# Patient Record
Sex: FEMALE | Race: WHITE | Employment: PART TIME | URBAN - METROPOLITAN AREA
[De-identification: names, ages, dates, MRNs, and addresses within clinical notes are randomized per-mention and may not be internally consistent; named-entity substitution may affect disease eponyms.]

---

## 2020-04-03 ENCOUNTER — HOSPITAL ENCOUNTER (EMERGENCY)
Age: 60
Discharge: HOME | End: 2020-04-03
Attending: EMERGENCY MEDICINE

## 2020-04-03 VITALS
HEART RATE: 92 BPM | HEIGHT: 63 IN | WEIGHT: 116 LBS | SYSTOLIC BLOOD PRESSURE: 128 MMHG | RESPIRATION RATE: 16 BRPM | OXYGEN SATURATION: 97 % | TEMPERATURE: 98.9 F | BODY MASS INDEX: 20.55 KG/M2 | DIASTOLIC BLOOD PRESSURE: 74 MMHG

## 2020-04-03 PROCEDURE — 99283 EMERGENCY DEPT VISIT LOW MDM: CPT

## 2020-04-03 RX ORDER — DOXYCYCLINE HYCLATE 100 MG/1
100 CAPSULE ORAL 2 TIMES DAILY
COMMUNITY
End: 2020-04-03

## 2020-04-03 ASSESSMENT — PAIN DESCRIPTION - LOCATION: LOCATION: THROAT

## 2020-04-03 ASSESSMENT — PAIN SCALES - GENERAL: PAINLEVEL_OUTOF10: 5

## 2020-04-03 NOTE — ED PROVIDER NOTES
HPI:   Loreta De León is a 61 y.o. female presenting to the ED for allergic rxn, beginning 4 hours ago. The complaint has been persistent, moderate in severity, and worsened by nothing. Pt has been experiencing mild congestion and discomfort for 2 weeks following sinus infection. Pt has been prescribed Z-pack by her PCP, which she did not complete since it made her feel tingly in the face and gave her a panic attack. Then today she was switched to doxy, took 1 and an hour later she felt some tingling in her face, 2 hours later there was more tingling. She called her pcp who advised benadryl. States benadryl takes away the tingling on her face. Having considerable polydipsia which \"cannot be satisfied\". She called her pcp and was advided to jordan 50mg benadryl every 4 hours till tomorrow. She has had multiple allergic medication reactions which are ill defined and do not have the classic allergy sx. Pt smokes half a pack per day and states bronchitis yearly around springtime. Patient denies SOB, fever/chills or other complaints. ROS:   Pertinent positives and negatives are stated within HPI, all other systems reviewed and are negative.    --------------------------------------------- PAST HISTORY ---------------------------------------------  Past Medical History:  has no past medical history on file. Past Surgical History:  has a past surgical history that includes Meckel's diverticulum excision. Social History:  reports that she has been smoking. She has been smoking about 0.50 packs per day. She has never used smokeless tobacco. She reports that she does not drink alcohol or use drugs. Family History: family history is not on file. The patients home medications have been reviewed.     Allergies: Penicillins    -------------------------------------------------- RESULTS -------------------------------------------------  All laboratory and radiology results have been personally reviewed

## 2020-09-18 ENCOUNTER — APPOINTMENT (OUTPATIENT)
Dept: CT IMAGING | Age: 60
End: 2020-09-18

## 2020-09-18 ENCOUNTER — APPOINTMENT (OUTPATIENT)
Dept: GENERAL RADIOLOGY | Age: 60
End: 2020-09-18

## 2020-09-18 ENCOUNTER — HOSPITAL ENCOUNTER (OUTPATIENT)
Age: 60
Setting detail: OBSERVATION
Discharge: HOME | End: 2020-09-19
Attending: EMERGENCY MEDICINE | Admitting: FAMILY MEDICINE

## 2020-09-18 VITALS
HEART RATE: 66 BPM | WEIGHT: 120 LBS | OXYGEN SATURATION: 98 % | BODY MASS INDEX: 21.26 KG/M2 | HEIGHT: 63 IN | TEMPERATURE: 97.4 F | RESPIRATION RATE: 16 BRPM | DIASTOLIC BLOOD PRESSURE: 72 MMHG | SYSTOLIC BLOOD PRESSURE: 129 MMHG

## 2020-09-18 PROBLEM — R07.9 CHEST PAIN: Status: ACTIVE | Noted: 2020-09-18

## 2020-09-18 LAB
ALBUMIN SERPL-MCNC: 4.5 G/DL (ref 3.5–5.2)
ALP BLD-CCNC: 72 U/L (ref 35–104)
ALT SERPL-CCNC: 18 U/L (ref 0–32)
ANION GAP SERPL CALCULATED.3IONS-SCNC: 10 MMOL/L (ref 7–16)
AST SERPL-CCNC: 27 U/L (ref 0–31)
BASOPHILS ABSOLUTE: 0.1 E9/L (ref 0–0.2)
BASOPHILS RELATIVE PERCENT: 0.8 % (ref 0–2)
BILIRUB SERPL-MCNC: 0.3 MG/DL (ref 0–1.2)
BUN BLDV-MCNC: 12 MG/DL (ref 8–23)
CALCIUM SERPL-MCNC: 10.1 MG/DL (ref 8.6–10.2)
CHLORIDE BLD-SCNC: 104 MMOL/L (ref 98–107)
CO2: 25 MMOL/L (ref 22–29)
CREAT SERPL-MCNC: 0.8 MG/DL (ref 0.5–1)
EKG ATRIAL RATE: 89 BPM
EKG ATRIAL RATE: 90 BPM
EKG P AXIS: 62 DEGREES
EKG P AXIS: 70 DEGREES
EKG P-R INTERVAL: 140 MS
EKG P-R INTERVAL: 148 MS
EKG Q-T INTERVAL: 358 MS
EKG Q-T INTERVAL: 364 MS
EKG QRS DURATION: 72 MS
EKG QRS DURATION: 74 MS
EKG QTC CALCULATION (BAZETT): 437 MS
EKG QTC CALCULATION (BAZETT): 442 MS
EKG R AXIS: 32 DEGREES
EKG R AXIS: 51 DEGREES
EKG T AXIS: 28 DEGREES
EKG T AXIS: 8 DEGREES
EKG VENTRICULAR RATE: 89 BPM
EKG VENTRICULAR RATE: 90 BPM
EOSINOPHILS ABSOLUTE: 0.07 E9/L (ref 0.05–0.5)
EOSINOPHILS RELATIVE PERCENT: 0.6 % (ref 0–6)
GFR AFRICAN AMERICAN: >60
GFR NON-AFRICAN AMERICAN: >60 ML/MIN/1.73
GLUCOSE BLD-MCNC: 110 MG/DL (ref 74–99)
HCT VFR BLD CALC: 42.3 % (ref 34–48)
HEMOGLOBIN: 14.5 G/DL (ref 11.5–15.5)
IMMATURE GRANULOCYTES #: 0.06 E9/L
IMMATURE GRANULOCYTES %: 0.5 % (ref 0–5)
LACTIC ACID, SEPSIS: 1.1 MMOL/L (ref 0.5–1.9)
LIPASE: 25 U/L (ref 13–60)
LYMPHOCYTES ABSOLUTE: 1.94 E9/L (ref 1.5–4)
LYMPHOCYTES RELATIVE PERCENT: 16 % (ref 20–42)
MCH RBC QN AUTO: 32.1 PG (ref 26–35)
MCHC RBC AUTO-ENTMCNC: 34.3 % (ref 32–34.5)
MCV RBC AUTO: 93.6 FL (ref 80–99.9)
MONOCYTES ABSOLUTE: 0.57 E9/L (ref 0.1–0.95)
MONOCYTES RELATIVE PERCENT: 4.7 % (ref 2–12)
NEUTROPHILS ABSOLUTE: 9.35 E9/L (ref 1.8–7.3)
NEUTROPHILS RELATIVE PERCENT: 77.4 % (ref 43–80)
PDW BLD-RTO: 13.5 FL (ref 11.5–15)
PLATELET # BLD: 275 E9/L (ref 130–450)
PMV BLD AUTO: 9.8 FL (ref 7–12)
POTASSIUM REFLEX MAGNESIUM: 4.3 MMOL/L (ref 3.5–5)
RBC # BLD: 4.52 E12/L (ref 3.5–5.5)
SODIUM BLD-SCNC: 139 MMOL/L (ref 132–146)
TOTAL PROTEIN: 7.9 G/DL (ref 6.4–8.3)
TROPONIN: <0.01 NG/ML (ref 0–0.03)
TROPONIN: <0.01 NG/ML (ref 0–0.03)
WBC # BLD: 12.1 E9/L (ref 4.5–11.5)

## 2020-09-18 PROCEDURE — 84484 ASSAY OF TROPONIN QUANT: CPT

## 2020-09-18 PROCEDURE — G0378 HOSPITAL OBSERVATION PER HR: HCPCS

## 2020-09-18 PROCEDURE — 83605 ASSAY OF LACTIC ACID: CPT

## 2020-09-18 PROCEDURE — 36415 COLL VENOUS BLD VENIPUNCTURE: CPT

## 2020-09-18 PROCEDURE — 74176 CT ABD & PELVIS W/O CONTRAST: CPT

## 2020-09-18 PROCEDURE — 80053 COMPREHEN METABOLIC PANEL: CPT

## 2020-09-18 PROCEDURE — 83690 ASSAY OF LIPASE: CPT

## 2020-09-18 PROCEDURE — 6370000000 HC RX 637 (ALT 250 FOR IP): Performed by: EMERGENCY MEDICINE

## 2020-09-18 PROCEDURE — 99285 EMERGENCY DEPT VISIT HI MDM: CPT

## 2020-09-18 PROCEDURE — 93005 ELECTROCARDIOGRAM TRACING: CPT | Performed by: EMERGENCY MEDICINE

## 2020-09-18 PROCEDURE — 71046 X-RAY EXAM CHEST 2 VIEWS: CPT

## 2020-09-18 PROCEDURE — 85025 COMPLETE CBC W/AUTO DIFF WBC: CPT

## 2020-09-18 PROCEDURE — 93010 ELECTROCARDIOGRAM REPORT: CPT | Performed by: INTERNAL MEDICINE

## 2020-09-18 RX ORDER — MULTIVIT WITH MINERALS/LUTEIN
1000 TABLET ORAL DAILY
COMMUNITY

## 2020-09-18 RX ORDER — NITROGLYCERIN 0.4 MG/1
0.4 TABLET SUBLINGUAL ONCE
Status: COMPLETED | OUTPATIENT
Start: 2020-09-18 | End: 2020-09-18

## 2020-09-18 RX ORDER — ASPIRIN 325 MG
325 TABLET ORAL ONCE
Status: COMPLETED | OUTPATIENT
Start: 2020-09-18 | End: 2020-09-18

## 2020-09-18 RX ADMIN — NITROGLYCERIN 0.4 MG: 0.4 TABLET, ORALLY DISINTEGRATING SUBLINGUAL at 14:01

## 2020-09-18 RX ADMIN — LIDOCAINE HYDROCHLORIDE: 20 SOLUTION ORAL; TOPICAL at 11:39

## 2020-09-18 RX ADMIN — ASPIRIN 325 MG: 325 TABLET, FILM COATED ORAL at 11:37

## 2020-09-18 ASSESSMENT — PAIN DESCRIPTION - PAIN TYPE
TYPE: ACUTE PAIN

## 2020-09-18 ASSESSMENT — PAIN SCALES - GENERAL
PAINLEVEL_OUTOF10: 4
PAINLEVEL_OUTOF10: 4
PAINLEVEL_OUTOF10: 2
PAINLEVEL_OUTOF10: 1
PAINLEVEL_OUTOF10: 1

## 2020-09-18 ASSESSMENT — PAIN DESCRIPTION - DESCRIPTORS
DESCRIPTORS: ACHING
DESCRIPTORS: TIGHTNESS

## 2020-09-18 ASSESSMENT — PAIN DESCRIPTION - LOCATION
LOCATION: ABDOMEN
LOCATION: ABDOMEN
LOCATION: CHEST;ARM

## 2020-09-18 ASSESSMENT — PAIN DESCRIPTION - ONSET: ONSET: SUDDEN

## 2020-09-18 ASSESSMENT — PAIN DESCRIPTION - ORIENTATION
ORIENTATION: LEFT;UPPER
ORIENTATION: LEFT
ORIENTATION: LEFT;UPPER

## 2020-09-18 ASSESSMENT — PAIN DESCRIPTION - PROGRESSION: CLINICAL_PROGRESSION: GRADUALLY WORSENING

## 2020-09-18 ASSESSMENT — PAIN DESCRIPTION - FREQUENCY: FREQUENCY: CONTINUOUS

## 2020-09-18 NOTE — ED NOTES
After getting IV in, pt stated that it was painful and wanted it taken out. Pt very nervous and shaking badly, refusing to have any further IV's in her arm. Able to talk pt into letting us at least stick her for the blood work. Blood sent to lab.       Brigid Urrutia RN  09/18/20 [id number]

## 2020-09-18 NOTE — ED PROVIDER NOTES
Department of Emergency Medicine   ED  Provider Note  Admit Date/RoomTime: 9/18/2020 11:11 AM  ED Room: 8210/8210-B          History of Present Illness:  9/18/20, Time: 12:18 PM EDT  Chief Complaint   Patient presents with    Chest Pain     gas and bloating all week long. Tightness in left chest and pain in left arm started last weekend. Geraldine Bates is a 61 y.o. female presenting to the ED for chest tightness, beginning several days ago. The complaint has been intermittent, moderate in severity, and worsened by nothing. Intermittent chest tightness and feeling like she has ingestion. Reports she has been having pain in her abdomen, feeling bloated and radiating up and down her chest. She also reports having separate chest tightness/pressure radiating to her arms. Intermittent symptoms. No shortness of breath. No sharp or stabbing pain. No ripping or taring pain. No back pain. No hx of DVT or PE    Review of Systems:   Pertinent positives and negatives are stated within HPI, all other systems reviewed and are negative.        --------------------------------------------- PAST HISTORY ---------------------------------------------  Past Medical History:  has no past medical history on file. Past Surgical History:  has a past surgical history that includes Meckel's diverticulum excision. Social History:  reports that she has been smoking. She has been smoking about 0.50 packs per day. She has never used smokeless tobacco. She reports that she does not drink alcohol or use drugs. Family History: family history is not on file. . Unless otherwise noted, family history is non contributory    The patients home medications have been reviewed.     Allergies: Amoxicillin; Penicillins; Sulfa antibiotics; and Zithromax [azithromycin]    I have reviewed the past medical history, past surgical history, social history, and family history    ---------------------------------------------------PHYSICAL EXAM--------------------------------------    Constitutional/General: Alert and oriented x3  Head: Normocephalic and atraumatic  Eyes: PERRL, EOMI, sclera non icteric  Mouth: Oropharynx clear, handling secretions  Neck: Supple, full ROM, no stridor, no meningeal signs  Respiratory: Lungs clear to auscultation bilaterally, no wheezes, rales, or rhonchi. Not in respiratory distress  Cardiovascular:  Regular rate. Regular rhythm. No murmurs, no aortic murmurs, no gallops, no rubs. 2+ distal pulses. Equal extremity pulses. Gastrointestinal:  Abdomen Soft, mild generalized tenderness. Non distended. No rebound, guarding, or rigidity. No pulsatile masses. Musculoskeletal: Moves all extremities x 4. Warm and well perfused, no clubbing, no cyanosis, no edema. Capillary refill <3 seconds  Skin: skin warm and dry. No rashes. Neurologic: GCS 15, no focal deficits        -------------------------------------------------- RESULTS -------------------------------------------------  I have personally reviewed all laboratory and imaging results for this patient. Results are listed below.      LABS: (Lab results interpreted by me)  Results for orders placed or performed during the hospital encounter of 09/18/20   CBC Auto Differential   Result Value Ref Range    WBC 12.1 (H) 4.5 - 11.5 E9/L    RBC 4.52 3.50 - 5.50 E12/L    Hemoglobin 14.5 11.5 - 15.5 g/dL    Hematocrit 42.3 34.0 - 48.0 %    MCV 93.6 80.0 - 99.9 fL    MCH 32.1 26.0 - 35.0 pg    MCHC 34.3 32.0 - 34.5 %    RDW 13.5 11.5 - 15.0 fL    Platelets 453 490 - 188 E9/L    MPV 9.8 7.0 - 12.0 fL    Neutrophils % 77.4 43.0 - 80.0 %    Immature Granulocytes % 0.5 0.0 - 5.0 %    Lymphocytes % 16.0 (L) 20.0 - 42.0 %    Monocytes % 4.7 2.0 - 12.0 %    Eosinophils % 0.6 0.0 - 6.0 %    Basophils % 0.8 0.0 - 2.0 %    Neutrophils Absolute 9.35 (H) 1.80 - 7.30 E9/L    Immature Granulocytes # 0.06 E9/L    Lymphocytes Absolute 1.94 1.50 - 4.00 E9/L    Monocytes Absolute 0.57 0.10 - 0.95 E9/L    Eosinophils Absolute 0.07 0.05 - 0.50 E9/L    Basophils Absolute 0.10 0.00 - 0.20 E9/L   Comprehensive Metabolic Panel w/ Reflex to MG   Result Value Ref Range    Sodium 139 132 - 146 mmol/L    Potassium reflex Magnesium 4.3 3.5 - 5.0 mmol/L    Chloride 104 98 - 107 mmol/L    CO2 25 22 - 29 mmol/L    Anion Gap 10 7 - 16 mmol/L    Glucose 110 (H) 74 - 99 mg/dL    BUN 12 8 - 23 mg/dL    CREATININE 0.8 0.5 - 1.0 mg/dL    GFR Non-African American >60 >=60 mL/min/1.73    GFR African American >60     Calcium 10.1 8.6 - 10.2 mg/dL    Total Protein 7.9 6.4 - 8.3 g/dL    Alb 4.5 3.5 - 5.2 g/dL    Total Bilirubin 0.3 0.0 - 1.2 mg/dL    Alkaline Phosphatase 72 35 - 104 U/L    ALT 18 0 - 32 U/L    AST 27 0 - 31 U/L   Troponin   Result Value Ref Range    Troponin <0.01 0.00 - 0.03 ng/mL   Lactate, Sepsis   Result Value Ref Range    Lactic Acid, Sepsis 1.1 0.5 - 1.9 mmol/L   Lipase   Result Value Ref Range    Lipase 25 13 - 60 U/L   Troponin   Result Value Ref Range    Troponin <0.01 0.00 - 0.03 ng/mL   EKG 12 Lead   Result Value Ref Range    Ventricular Rate 90 BPM    Atrial Rate 90 BPM    P-R Interval 148 ms    QRS Duration 72 ms    Q-T Interval 358 ms    QTc Calculation (Bazett) 437 ms    P Axis 62 degrees    R Axis 32 degrees    T Axis 8 degrees   EKG 12 Lead   Result Value Ref Range    Ventricular Rate 89 BPM    Atrial Rate 89 BPM    P-R Interval 140 ms    QRS Duration 74 ms    Q-T Interval 364 ms    QTc Calculation (Bazett) 442 ms    P Axis 70 degrees    R Axis 51 degrees    T Axis 28 degrees   ,       RADIOLOGY:  Interpreted by Radiologist unless otherwise specified  CT ABDOMEN PELVIS WO CONTRAST Additional Contrast? None   Final Result      1. Tiny hepatic probable cysts. 2. A 1.2 cm left ovarian cyst.      3. No diverticulitis or bowel obstruction.                XR CHEST (2 VW)   Final Result   Mild emphysematous pulmonary hyperinflation/air trapping without   evidence of acute cardiopulmonary pathology. EKG Interpretation  Interpreted by emergency department physician, Dr. Aakash Paez     Date of EK20  Time: 113    Rhythm: normal sinus   Rate: normal  Axis: normal  Conduction: normal  ST Segments: depression in  v3, v4, v5, v6, II, III and aVf  T Waves: no acute change    Clinical Impression: sinus rhythm with inferior lateral ST depression, no ST elevation  Comparison to prior EKG: no previous EKG      ------------------------- NURSING NOTES AND VITALS REVIEWED ---------------------------   The nursing notes within the ED encounter and vital signs as below have been reviewed by myself  /72   Pulse 66   Temp 97.4 °F (36.3 °C) (Temporal)   Resp 16   Ht 5' 3\" (1.6 m)   Wt 120 lb (54.4 kg)   SpO2 98%   BMI 21.26 kg/m²     Oxygen Saturation Interpretation: Normal    The patients available past medical records and past encounters were reviewed. ------------------------------ ED COURSE/MEDICAL DECISION MAKING----------------------  Medications   iopamidol (ISOVUE-370) 76 % injection 100 mL (has no administration in time range)   aspirin tablet 325 mg (325 mg Oral Given 20 1137)   aluminum & magnesium hydroxide-simethicone (MAALOX) 30 mL, lidocaine viscous hcl (XYLOCAINE) 5 mL (GI COCKTAIL) ( Oral Given 20 1139)   nitroGLYCERIN (NITROSTAT) SL tablet 0.4 mg (0.4 mg Sublingual Given 20 1401)          I, Dr. Aakash Paez, am the primary provider of record    Medical Decision Making:   Chest pain  EKG with ST depressions   No ST elevation  Trop pending  Aspirin given  Will need admission  Signed out to Dr. Nathaniel Cope pending completion of work up      Oxygen Saturation Interpretation: 98 % on RA. Normal      Re-Evaluations:  ED Course as of Sep 18 2130   Fri Sep 18, 2020   1245 Resumed care for Dr. Aakash Paez, introduced myself.  Patient states that she does feel better and does not want Nitro at this time [BP]   1510 Spoke with Eloy Pulido, agreed to accept patient. [BP]   1528 Rechecked patient, resting comfortably, no distress with stable vitals on monitor, CP free    [BP]      ED Course User Index  [BP] Kraig Jet, DO             This patient's ED course included: a personal history and physicial examination, re-evaluation prior to disposition, multiple bedside re-evaluations, IV medications, cardiac monitoring, continuous pulse oximetry and complex medical decision making and emergency management    This patient has remained hemodynamically stable during their ED course.             --------------------------------- IMPRESSION AND DISPOSITION ---------------------------------    IMPRESSION  1. Chest pain, unspecified type        DISPOSITION  Disposition: Admit to telemetry  Patient condition is stable        NOTE: This report was transcribed using voice recognition software. Every effort was made to ensure accuracy; however, inadvertent computerized transcription errors may be present      Adams Pacheco MD  09/18/20 2130      I was transitioned to the care team from the care [facility], as his shift was over and the work-up was still in progress. Introduced myself to the patient and similarly to Dr. Ana Laura Romero note the patient did endorse some ingestion and chest tightness intermittently for the past several days. Into the room the patient was in no distress whatsoever on the monitor resting comfortably stating that she did have some indigestion but denies any chest pain. Patient stated that she did have some improvement after the GI cocktail, and was eventually given some nitroglycerin which she stated did resolve her symptoms. I also reviewed the patient's EKG which showed some borderline ST depressions in the anterior lateral leads and her troponin came back unremarkable. The case was discussed with Eloy Pulido who was presented to the case and agreed to admit the patient.  Patient remained hemodynamically stable throughout the course of her stay in the ED. Patient was agreeable to plan.      Genevieve Dewitt, DO  09/19/20 1009

## 2020-09-19 LAB — TROPONIN: <0.01 NG/ML (ref 0–0.03)

## 2020-09-19 PROCEDURE — G0378 HOSPITAL OBSERVATION PER HR: HCPCS

## 2020-09-19 PROCEDURE — 36415 COLL VENOUS BLD VENIPUNCTURE: CPT

## 2020-09-19 PROCEDURE — 99204 OFFICE O/P NEW MOD 45 MIN: CPT | Performed by: INTERNAL MEDICINE

## 2020-09-19 PROCEDURE — 84484 ASSAY OF TROPONIN QUANT: CPT

## 2020-09-19 PROCEDURE — APPSS60 APP SPLIT SHARED TIME 46-60 MINUTES: Performed by: PHYSICIAN ASSISTANT

## 2020-09-19 RX ORDER — DICYCLOMINE HYDROCHLORIDE 10 MG/1
10 CAPSULE ORAL 4 TIMES DAILY
Qty: 120 CAPSULE | Refills: 3 | Status: SHIPPED | OUTPATIENT
Start: 2020-09-19

## 2020-09-19 RX ORDER — PANTOPRAZOLE SODIUM 40 MG/1
40 TABLET, DELAYED RELEASE ORAL DAILY
Qty: 30 TABLET | Refills: 2 | Status: SHIPPED | OUTPATIENT
Start: 2020-09-19

## 2020-09-19 NOTE — DISCHARGE SUMMARY
Dr. Randall Means M.D. [facility])  Nurse Practitioner Discharge Summary          Patient ID:  Alexa Gutierrez  [id number]  61 y.o.  1960    Admit date: 9/18/2020    Discharge date:       Admission Diagnoses: Chest pain [R07.9]    Consults: IP CONSULT TO INTERNAL MEDICINE  IP CONSULT TO CARDIOLOGY    Hospital Course: patient admitted for less than 24 hours. See H&P. Seen by cardiology. Not cardiac related CP. Discharge home with protonix and bentyl. Follow up 2 weeks in office.        Results:     Labs:  Results for orders placed or performed during the hospital encounter of 09/18/20   CBC Auto Differential   Result Value Ref Range    WBC 12.1 (H) 4.5 - 11.5 E9/L    RBC 4.52 3.50 - 5.50 E12/L    Hemoglobin 14.5 11.5 - 15.5 g/dL    Hematocrit 42.3 34.0 - 48.0 %    MCV 93.6 80.0 - 99.9 fL    MCH 32.1 26.0 - 35.0 pg    MCHC 34.3 32.0 - 34.5 %    RDW 13.5 11.5 - 15.0 fL    Platelets 598 247 - 229 E9/L    MPV 9.8 7.0 - 12.0 fL    Neutrophils % 77.4 43.0 - 80.0 %    Immature Granulocytes % 0.5 0.0 - 5.0 %    Lymphocytes % 16.0 (L) 20.0 - 42.0 %    Monocytes % 4.7 2.0 - 12.0 %    Eosinophils % 0.6 0.0 - 6.0 %    Basophils % 0.8 0.0 - 2.0 %    Neutrophils Absolute 9.35 (H) 1.80 - 7.30 E9/L    Immature Granulocytes # 0.06 E9/L    Lymphocytes Absolute 1.94 1.50 - 4.00 E9/L    Monocytes Absolute 0.57 0.10 - 0.95 E9/L    Eosinophils Absolute 0.07 0.05 - 0.50 E9/L    Basophils Absolute 0.10 0.00 - 0.20 E9/L   Comprehensive Metabolic Panel w/ Reflex to MG   Result Value Ref Range    Sodium 139 132 - 146 mmol/L    Potassium reflex Magnesium 4.3 3.5 - 5.0 mmol/L    Chloride 104 98 - 107 mmol/L    CO2 25 22 - 29 mmol/L    Anion Gap 10 7 - 16 mmol/L    Glucose 110 (H) 74 - 99 mg/dL    BUN 12 8 - 23 mg/dL    CREATININE 0.8 0.5 - 1.0 mg/dL    GFR Non-African American >60 >=60 mL/min/1.73    GFR African American >60     Calcium 10.1 8.6 - 10.2 mg/dL    Total Protein 7.9 6.4 - 8.3 g/dL    Alb 4.5 3.5 - 5.2 g/dL Total Bilirubin 0.3 0.0 - 1.2 mg/dL    Alkaline Phosphatase 72 35 - 104 U/L    ALT 18 0 - 32 U/L    AST 27 0 - 31 U/L   Troponin   Result Value Ref Range    Troponin <0.01 0.00 - 0.03 ng/mL   Lactate, Sepsis   Result Value Ref Range    Lactic Acid, Sepsis 1.1 0.5 - 1.9 mmol/L   Lipase   Result Value Ref Range    Lipase 25 13 - 60 U/L   Troponin   Result Value Ref Range    Troponin <0.01 0.00 - 0.03 ng/mL   Troponin   Result Value Ref Range    Troponin <0.01 0.00 - 0.03 ng/mL   EKG 12 Lead   Result Value Ref Range    Ventricular Rate 90 BPM    Atrial Rate 90 BPM    P-R Interval 148 ms    QRS Duration 72 ms    Q-T Interval 358 ms    QTc Calculation (Bazett) 437 ms    P Axis 62 degrees    R Axis 32 degrees    T Axis 8 degrees   EKG 12 Lead   Result Value Ref Range    Ventricular Rate 89 BPM    Atrial Rate 89 BPM    P-R Interval 140 ms    QRS Duration 74 ms    Q-T Interval 364 ms    QTc Calculation (Bazett) 442 ms    P Axis 70 degrees    R Axis 51 degrees    T Axis 28 degrees       Radiology:  Ct Abdomen Pelvis Wo Contrast Additional Contrast? None    Result Date: 2020  Patient MRN:  [id number] : 1960 Age: 61 years Gender: Female Order Date:  2020 12:40 PM EXAM: CT ABDOMEN PELVIS WO CONTRAST INDICATION:  abd pain, bloating . TECHNIQUE: Axial images from the lung bases to the pubic symphysis without any IV or oral contrast. Axial sagittal and coronal 2-D reconstructions with soft tissue windows. Limited axial lung windows. Dose reduction techniques with automated exposure control. Contrast is none. Dose is total .45 MG Y CM. COMPARISON: None FINDINGS:  Lung bases: Clear. No infiltrate or effusion. Heart size normal. No hiatal hernia. ABDOMEN: The liver has a few scattered tiny low-density areas which are likely small cysts, none larger than 1 cm. The bile ducts are not dilated. The gallbladder is present, normal size with no calcified stones or wall thickening. No ascites.  Negative noncontrast pancreas, spleen, adrenal glands. No renal or ureter stone or hydronephrosis. Both kidneys have a minimally prominent extrarenal pelvis. Small focus of cortical scarring posterior superior right kidney. Aorta is calcified near the bifurcation, no aneurysm. A normal appearance of the stomach and small bowel loops. Pelvis: The appendix appears to be absent. Moderate amount of residual stool in the proximal colon. Mild gas and stool in the transverse colon. Nondistended descending and sigmoid colon. Minimal stool in the rectum. No evidence for obstruction or acute diverticulitis. The small bowel loops are not dilated and do not show significant fluid retention. The urinary bladder was nearly empty at the time of exam, no stones or wall thickening. Anteverted uterus without evidence for fibroids. The left ovary contains a 1.2 cm cyst. The right ovary is small in size, no obvious cyst. No free fluid. No ventral hernias. Bones: No compression fracture. Exaggerated moderate lumbar lordosis. Transitional S1 segment. A mild broad-based disc bulge L5/S1. 1. Tiny hepatic probable cysts. 2. A 1.2 cm left ovarian cyst. 3. No diverticulitis or bowel obstruction. Xr Chest (2 Vw)    Result Date: 2020  Patient MRN: [id number] : 1960 Age:  61 years Gender: Female Order Date: 2020 12:06 PM Exam: XR CHEST (2 VW) Number of Images: 1 view Indication:  Chest pain on left, extending to left arm Comparison: None. Findings: The lungs are symmetrically hyperinflated, and are clear. There is no evidence of pneumothorax or pleural effusion. Cardiovascular shadows are normal in appearance. There is no evidence of cardiac enlargement or decompensation. Skeletal structures show no evidence of acute pathology. Mild spinal degenerative changes are noted. Overlying EKG leads are present. Mild emphysematous pulmonary hyperinflation/air trapping without evidence of acute cardiopulmonary pathology.         Discharge Exam: Vitals:   /72   Pulse 66   Temp 97.4 °F (36.3 °C) (Temporal)   Resp 16   Ht 5' 3\" (1.6 m)   Wt 120 lb (54.4 kg)   SpO2 98%   BMI 21.26 kg/m²            Discharge Diagnoses:     Patient Active Problem List   Diagnosis Code    Chest pain R07.9         Disposition: home    Patient Instructions:   Current Discharge Medication List      START taking these medications    Details   pantoprazole (PROTONIX) 40 MG tablet Take 1 tablet by mouth daily  Qty: 30 tablet, Refills: 2      dicyclomine (BENTYL) 10 MG capsule Take 1 capsule by mouth 4 times daily  Qty: 120 capsule, Refills: 3         CONTINUE these medications which have NOT CHANGED    Details   vitamin E 1000 units capsule Take 1,000 Units by mouth daily         STOP taking these medications       albuterol (PROAIR HFA) 108 (90 BASE) MCG/ACT inhaler Comments:   Reason for Stopping:             Activity: activity as tolerated  Diet: regular diet    Follow-up: with Dorita Saravia in 2 weeks. The discharge of this patient was agreed upon by the consulting providers as well as Dr. Howard Rizvi.     Signed:    Electronically signed by RICARDO Vences CNP on 9/19/2020 at 11:20 AM

## 2020-09-19 NOTE — CONSULTS
Patient seen and examined. Chart, labs, imaging studies, EKG and rhythm strips reviewed. Full consult to follow. We were asked to see the patient for chest pain. IMPRESSION:  1. GI symptoms (abdominal bloating with associated flatulence and belching), not cardiac. 2. Left arm discomfort/numbness, musculoskeletal/neuropathic. 3. Tobacco abuse. PLAN:   1. Advised smoking cessation. 2. May be discharged from a cardiology standpoint without any additional cardiac testing (as inpatient). 3. Cardiology will sign off. Please call if needed.     Electronically signed by Cyndie Paece MD on 9/19/2020 at 9:36 AM

## 2020-09-19 NOTE — H&P
Dr. Mukund Orta M.D. [employer])  Nurse Practitioner History and Physical  Date: 9/19/20,   Time: 11:18 AM EDT     CHIEF COMPLAINT:    Chief Complaint   Patient presents with    Chest Pain     gas and bloating all week long. Tightness in left chest and pain in left arm started last weekend. Informant for H&P: patient      HISTORY OF PRESENT ILLNESS:     Stephan Pulido is a 61 y.o. female patient of Dr. Jose Alberto Amador who presented to the emergency room with  complaints of GI symptoms and left arm numbness/tingling. Patient states that for the past week or so, she has not been feeling well. She notes of intermittent abdominal distention with associated bloating, flatulence and belching. She states that she feels a pressure diffusely throughout her abdomen, with radiation into her epigastric area. She also notes of left arm numbness and tingling intermittently over the past week or so as well. Sometimes the left arm paresthesias is related to the abdominal pain, sometimes it is a separate issue. She denies any actual chest discomfort. She states that each episode of abdominal discomfort can last hours at a time. She additionally describes her left arm paresthesias as lasting hours at a time as well. Neither complaint is related to exertion. She states that she noticed improvement of all of her symptoms after eating meals, as well as after taking Maalox. Additionally, she also states after belching and flatulence, the discomfort and symptoms improve. She notes of associated nausea, but denies any complaints of emesis, bloody stools, change in bowel habits, shortness of breath, diaphoresis, palpitations, dizziness, near-syncope or syncope. She denies paroxysmal nocturnal dyspnea, orthopnea or peripheral edema. She denies any subjective fever, chills, cough or any recent sick contacts. She is currently asymptomatic at this time during interview/exam today.   She is a current everyday tobacco smoker, smokes less than one pack/day. Has smoked for 40+ years. Denies former current alcohol or illicit drug use. States her father had a history of coronary artery disease and myocardial infarction in his 62s. Denies any other pertinent cardiac family medical history at this time. She was admitted overnight for observation with consultation to cardiology. Past Medical History:    History reviewed. No pertinent past medical history.       Past Surgical History:    Past Surgical History:   Procedure Laterality Date    MECKEL DIVERTICULUM EXCISION         Medications Prior to Admission:    Medications Prior to Admission: vitamin E 1000 units capsule, Take 1,000 Units by mouth daily    Allergies:    Amoxicillin; Penicillins; Sulfa antibiotics; and Zithromax [azithromycin]    Social History:   Social History     Socioeconomic History    Marital status:      Spouse name: Not on file    Number of children: Not on file    Years of education: Not on file    Highest education level: Not on file   Occupational History    Not on file   Social Needs    Financial resource strain: Not on file    Food insecurity     Worry: Not on file     Inability: Not on file    Transportation needs     Medical: Not on file     Non-medical: Not on file   Tobacco Use    Smoking status: Current Every Day Smoker     Packs/day: 0.50    Smokeless tobacco: Never Used   Substance and Sexual Activity    Alcohol use: No    Drug use: No    Sexual activity: Not on file   Lifestyle    Physical activity     Days per week: Not on file     Minutes per session: Not on file    Stress: Not on file   Relationships    Social connections     Talks on phone: Not on file     Gets together: Not on file     Attends Baptism service: Not on file     Active member of club or organization: Not on file     Attends meetings of clubs or organizations: Not on file     Relationship status: Not on file    Intimate partner violence Fear of current or ex partner: Not on file     Emotionally abused: Not on file     Physically abused: Not on file     Forced sexual activity: Not on file   Other Topics Concern    Not on file   Social History Narrative    Not on file       Family History:   History reviewed. No pertinent family history. REVIEW OF SYSTEMS:  Constitutional: Fever [-]  Abnormal Weight loss [-]   Eyes: Visual impairment with glasses  [-]   Ears, nose, mouth, throat, and face: Hearing loss [-] Dry mucous membranes dry [-]    Respiratory: SOB [-] Cough [-] Productive [-] dry [-]   Cardiovascular: Chest pain [-] Palpitations [-] Near Syncope [-] Dizziness [-]  Orthopnea [-] Paroxysmal Nocturnal Dysnea [-]  Gastrointestinal: Nausea [-] Vomiting [-] Diarrhea [-] Constipation [-] Abdominal pain [+] Blood per rectum[-] Hematemesis [-]     Genitourinary:Dysuria [-] frequency [-] hematuria [-]  Integument/breast: discharge [-] ,masses [-]  Hematologic/lymphatic:Anemia [ -] Bleeding disorder [-] Clotting disorders [-]  Musculoskeletal: Osteoarthritis [ -]  Knee pain [ -] Hip pain [ - ] Ankle pain [-]  Neurological: CVA [- ] Peripheral neuropathy [ - ] Head ache [-] Syncope [-] seizure disorder [-] Falls [-] Migraine [-]  Behavioral/Psych:Depression [ - ] Anxiety [- ] Insomnia [ -] Bipolar disorder [ - ]  Endocrine: DM [- ] Hypothyroidism [- ]   Skin:  rashes [ - ]  Ulcers [ - ] . All the 14 systems reviewed in detail .       PHYSICAL EXAM:    Vitals:     /72   Pulse 66   Temp 97.4 °F (36.3 °C) (Temporal)   Resp 16   Ht 5' 3\" (1.6 m)   Wt 120 lb (54.4 kg)   SpO2 98%   BMI 21.26 kg/m²          Physical Exam:   General Appearance: alert and oriented to person, place and time, well developed and well- nourished, in no acute distress  Skin: warm and dry, no rash or erythema  Head: normocephalic and atraumatic  Eyes: pupils equal, round, and reactive to light, extraocular eye movements intact, conjunctivae normal  ENT: hearing is 1.0 mg/dL    GFR Non-African American >60 >=60 mL/min/1.73    GFR African American >60     Calcium 10.1 8.6 - 10.2 mg/dL    Total Protein 7.9 6.4 - 8.3 g/dL    Alb 4.5 3.5 - 5.2 g/dL    Total Bilirubin 0.3 0.0 - 1.2 mg/dL    Alkaline Phosphatase 72 35 - 104 U/L    ALT 18 0 - 32 U/L    AST 27 0 - 31 U/L   Troponin   Result Value Ref Range    Troponin <0.01 0.00 - 0.03 ng/mL   Lactate, Sepsis   Result Value Ref Range    Lactic Acid, Sepsis 1.1 0.5 - 1.9 mmol/L   Lipase   Result Value Ref Range    Lipase 25 13 - 60 U/L   Troponin   Result Value Ref Range    Troponin <0.01 0.00 - 0.03 ng/mL   Troponin   Result Value Ref Range    Troponin <0.01 0.00 - 0.03 ng/mL   EKG 12 Lead   Result Value Ref Range    Ventricular Rate 90 BPM    Atrial Rate 90 BPM    P-R Interval 148 ms    QRS Duration 72 ms    Q-T Interval 358 ms    QTc Calculation (Bazett) 437 ms    P Axis 62 degrees    R Axis 32 degrees    T Axis 8 degrees   EKG 12 Lead   Result Value Ref Range    Ventricular Rate 89 BPM    Atrial Rate 89 BPM    P-R Interval 140 ms    QRS Duration 74 ms    Q-T Interval 364 ms    QTc Calculation (Bazett) 442 ms    P Axis 70 degrees    R Axis 51 degrees    T Axis 28 degrees       RADIOLOGY:    Ct Abdomen Pelvis Wo Contrast Additional Contrast? None    Result Date: 2020  Patient MRN:  [id number] : 1960 Age: 61 years Gender: Female Order Date:  2020 12:40 PM EXAM: CT ABDOMEN PELVIS WO CONTRAST INDICATION:  abd pain, bloating . TECHNIQUE: Axial images from the lung bases to the pubic symphysis without any IV or oral contrast. Axial sagittal and coronal 2-D reconstructions with soft tissue windows. Limited axial lung windows. Dose reduction techniques with automated exposure control. Contrast is none. Dose is total .45 MG Y CM. COMPARISON: None FINDINGS:  Lung bases: Clear. No infiltrate or effusion. Heart size normal. No hiatal hernia.  ABDOMEN: The liver has a few scattered tiny low-density areas which are likely small cysts, none larger than 1 cm. The bile ducts are not dilated. The gallbladder is present, normal size with no calcified stones or wall thickening. No ascites. Negative noncontrast pancreas, spleen, adrenal glands. No renal or ureter stone or hydronephrosis. Both kidneys have a minimally prominent extrarenal pelvis. Small focus of cortical scarring posterior superior right kidney. Aorta is calcified near the bifurcation, no aneurysm. A normal appearance of the stomach and small bowel loops. Pelvis: The appendix appears to be absent. Moderate amount of residual stool in the proximal colon. Mild gas and stool in the transverse colon. Nondistended descending and sigmoid colon. Minimal stool in the rectum. No evidence for obstruction or acute diverticulitis. The small bowel loops are not dilated and do not show significant fluid retention. The urinary bladder was nearly empty at the time of exam, no stones or wall thickening. Anteverted uterus without evidence for fibroids. The left ovary contains a 1.2 cm cyst. The right ovary is small in size, no obvious cyst. No free fluid. No ventral hernias. Bones: No compression fracture. Exaggerated moderate lumbar lordosis. Transitional S1 segment. A mild broad-based disc bulge L5/S1. 1. Tiny hepatic probable cysts. 2. A 1.2 cm left ovarian cyst. 3. No diverticulitis or bowel obstruction. Xr Chest (2 Vw)    Result Date: 2020  Patient MRN: [id number] : 1960 Age:  61 years Gender: Female Order Date: 2020 12:06 PM Exam: XR CHEST (2 VW) Number of Images: 1 view Indication:  Chest pain on left, extending to left arm Comparison: None. Findings: The lungs are symmetrically hyperinflated, and are clear. There is no evidence of pneumothorax or pleural effusion. Cardiovascular shadows are normal in appearance. There is no evidence of cardiac enlargement or decompensation. Skeletal structures show no evidence of acute pathology.  Mild spinal degenerative changes are noted. Overlying EKG leads are present. Mild emphysematous pulmonary hyperinflation/air trapping without evidence of acute cardiopulmonary pathology. ASSESSMENT:      Patient Active Problem List   Diagnosis Code    Chest pain R07.9       PLAN:     Admit overnight, cycle cardiac enzymes  Consultation to cardiology    Review the  old chart , Case discussed  with other consultants and Dr. Shelley Norton as required.       Electronically signed by RICARDO Moss CNP on 9/19/2020 at 11:18 AM

## 2020-09-19 NOTE — CONSULTS
Inpatient Cardiology Consultation      Reason for Consult: Chest pain     Consulting Physician: Dr. Homa Harvey    Requesting Physician: EMILY Villasenor    Date of Consultation: 9/19/2020    HISTORY OF PRESENT ILLNESS:   Patient is a 61year-old female not previously known to [address] [place] [address] Cardiology. She is being seen in initial consultation by Dr. Homa Harvey for evaluation and recommendations regarding chest discomfort. Patient has limited past medical history of tobacco abuse. She only takes over-the-counter vitamins at home. She denies any personal history of coronary artery disease, hypertension, hyperlipidemia, diabetes mellitus, heart failure, cardiac arrhythmia, myocardial infarction or valvular heart disease. She states she may have had a stress test 20+ years ago, with no further evaluation indicated at that time per the patient. She denies any prior echocardiogram or left heart catheterization. She does not follow with a cardiologist.  She presented to [facility] on [facility] 18, 2020 due to complaints of GI symptoms and left arm numbness/tingling. Patient states that for the past week or so, she has not been feeling well. She notes of intermittent abdominal distention with associated bloating, flatulence and belching. She states that she feels a pressure diffusely throughout her abdomen, with radiation into her epigastric area. She also notes of left arm numbness and tingling intermittently over the past week or so as well. Sometimes the left arm paresthesias is related to the abdominal pain, sometimes it is a separate issue. She denies any actual chest discomfort. She states that each episode of abdominal discomfort can last hours at a time. She additionally describes her left arm paresthesias as lasting hours at a time as well. Neither complaint is related to exertion. She states that she noticed improvement of all of her symptoms after eating meals, as well as after taking Maalox.   Additionally, she also states after belching and flatulence, the discomfort and symptoms improve. She notes of associated nausea, but denies any complaints of emesis, bloody stools, change in bowel habits, shortness of breath, diaphoresis, palpitations, dizziness, near-syncope or syncope. She denies paroxysmal nocturnal dyspnea, orthopnea or peripheral edema. She denies any subjective fever, chills, cough or any recent sick contacts. She is currently asymptomatic at this time during interview/exam today. She is a current everyday tobacco smoker, smokes less than one pack/day. Has smoked for 40+ years. Denies former current alcohol or illicit drug use. States her father had a history of coronary artery disease and myocardial infarction in his 62s. Denies any other pertinent cardiac family medical history at this time. Labs and diagnostic testing as noted below. Please note: past medical records were reviewed per electronic medical record (EMR) - see detailed reports under Past Medical/ Surgical History. PAST MEDICAL HISTORY:    1. Tobacco abuse. PAST SURGICAL HISTORY:    Past Surgical History:   Procedure Laterality Date    MECKEL DIVERTICULUM EXCISION         HOME MEDICATIONS:  Prior to Admission medications    Medication Sig Start Date End Date Taking? Authorizing Provider   vitamin E 1000 units capsule Take 1,000 Units by mouth daily   Yes Historical Provider, MD       CURRENT MEDICATIONS:      Current Facility-Administered Medications:     iopamidol (ISOVUE-370) 76 % injection 100 mL, 100 mL, Intravenous, ONCE PRN, Medardo Servin MD      ALLERGIES:  Amoxicillin; Penicillins; Sulfa antibiotics; and Zithromax [azithromycin]    SOCIAL HISTORY:    She is a current everyday tobacco smoker, smokes less than one pack/day. Has smoked for 40+ years. Denies former current alcohol or illicit drug use.       FAMILY HISTORY:   States her father had a history of coronary artery disease and myocardial infarction in his 62s. Denies any other pertinent cardiac family medical history at this time. REVIEW OF SYSTEMS:     · Constitutional: Denies fevers, chills, night sweats, and generalized fatigue. Denies significant weight loss or weight gain. · HEENT: Denies headaches, nose bleeds, rhinorrhea, sore throat. Denies blurred vision. Denies dysphagia, odynophagia. · Musculoskeletal: Denies falls, pain to BLE with ambulation. Denies muscle weakness. · Neurological: +left arm paresthesias. Denies dizziness and lightheadedness. Denies focal neurological deficits. · Cardiovascular: Denies chest pain, palpitations, diaphoresis. Denies syncope. Denies PND, orthopnea, peripheral edema. · Respiratory: Denies shortness of breath at rest or with exertion. Denies cough, hemoptysis. · Gastrointestinal: +abdominal bloating/pressure, +flatulence, +nausea. Denies vomiting, diarrhea and constipation, black/bloody, and tarry stools. · Genitourinary: Denies dysuria and hematuria. · Hematologic: Denies excessive bruising or bleeding. · Endocrine: Denies excessive thirst. Denies intolerance to hot and cold. · Psychiatric: Denies anxiety and depression. PHYSICAL EXAM:   /72   Pulse 66   Temp 97.4 °F (36.3 °C) (Temporal)   Resp 16   Ht 5' 3\" (1.6 m)   Wt 120 lb (54.4 kg)   SpO2 98%   BMI 21.26 kg/m²   CONST:  Well developed, well nourished WF who appears stated age. Awake, alert, cooperative, no apparent distress. HEENT:   Head- Normocephalic, atraumatic. Eyes- Conjunctivae pink, anicteric. Throat- Oral mucosa pink and moist.  Neck-  No stridor, trachea midline, no apparent jugular venous distention. CHEST: Chest symmetrical and non-tender to palpation. No accessory muscle use or intercostal retractions. RESPIRATORY: Lung sounds - clear throughout fields. No wheezing, rales or rhonchi. Diminished. CARDIOVASCULAR:     No carotid bruit. Heart Inspection- shows no noted pulsations.   Heart Palpation- no heaves or thrills; PMI is non-displaced. Heart Ausculation- Regular rate and rhythm, no apparent murmur. No s3, s4 or rub. PV: No lower extremity edema. No varicosities. Pedal pulses palpable, no clubbing or cyanosis. ABDOMEN: Soft, non-tender to light palpation. Bowel sounds present. MS: Good muscle strength and tone. No atrophy or abnormal movements. SKIN: Warm and dry. No statis dermatitis or ulcers. NEURO / PSYCH: Oriented to person, place and time. Speech clear and appropriate. Follows all commands. Pleasant affect. DATA:    Telemetry: NSR with HR in the 70s, artifact    Diagnostic:  All diagnostic testing and lab work thus far this admission reviewed in detail. CXR 09/18/2020: Impression:  Mild emphysematous pulmonary hyperinflation/air trapping without   evidence of acute cardiopulmonary pathology. CT Abdomen/Pelvis 09/18/2020: Impression:  1. Tiny hepatic probable cysts. 2. A 1.2 cm left ovarian cyst.  3. No diverticulitis or bowel obstruction. Intake/Output Summary (Last 24 hours) at 9/19/2020 0734  Last data filed at 9/18/2020 2300  Gross per 24 hour   Intake 250 ml   Output --   Net 250 ml       Labs:   CBC:   Recent Labs     09/18/20  1127   WBC 12.1*   HGB 14.5   HCT 42.3        BMP:   Recent Labs     09/18/20  1127      K 4.3   CO2 25   BUN 12   CREATININE 0.8   LABGLOM >60   CALCIUM 10.1     CARDIAC ENZYMES:  Recent Labs     09/18/20  1127 09/18/20  2002 09/19/20  0248   TROPONINI <0.01 <0.01 <0.01     LIVER PROFILE:  Recent Labs     09/18/20  1127   AST 27   ALT 18   LABALBU 4.5               Assessment and plan to follow as per Dr. Constanza Phipps. Darryl Ahumada, [address] [place] [address], [place] [id number] Cardiology    Electronically signed by Zana Granados PA-C on 9/19/2020 at 7:34 AM         I personally and independently saw and examined patient and reviewed all done pertinent laboratory data, imaging studies, ECGs and rhythm strips.  I have reviewed and agree with the APN history and

## 2020-09-19 NOTE — PLAN OF CARE
Problem: Pain:  Goal: Control of acute pain  Description: Control of acute pain  Outcome: Met This Shift     Problem: Falls - Risk of:  Goal: Will remain free from falls  Description: Will remain free from falls  Outcome: Met This Shift  Goal: Absence of physical injury  Description: Absence of physical injury  Outcome: Met This Shift     Problem: Cardiac Output - Decreased:  Goal: Hemodynamic stability will improve  Description: Hemodynamic stability will improve  Outcome: Met This Shift

## 2020-11-01 ENCOUNTER — HOSPITAL ENCOUNTER (EMERGENCY)
Age: 60
Discharge: HOME | End: 2020-11-01
Attending: EMERGENCY MEDICINE

## 2020-11-01 VITALS
DIASTOLIC BLOOD PRESSURE: 72 MMHG | TEMPERATURE: 98.9 F | HEART RATE: 105 BPM | RESPIRATION RATE: 16 BRPM | BODY MASS INDEX: 23.04 KG/M2 | SYSTOLIC BLOOD PRESSURE: 132 MMHG | WEIGHT: 130 LBS | OXYGEN SATURATION: 96 % | HEIGHT: 63 IN

## 2020-11-01 PROCEDURE — 99284 EMERGENCY DEPT VISIT MOD MDM: CPT

## 2020-11-01 PROCEDURE — U0003 INFECTIOUS AGENT DETECTION BY NUCLEIC ACID (DNA OR RNA); SEVERE ACUTE RESPIRATORY SYNDROME CORONAVIRUS 2 (SARS-COV-2) (CORONAVIRUS DISEASE [COVID-19]), AMPLIFIED PROBE TECHNIQUE, MAKING USE OF HIGH THROUGHPUT TECHNOLOGIES AS DESCRIBED BY CMS-2020-01-R: HCPCS

## 2020-11-01 PROCEDURE — 6370000000 HC RX 637 (ALT 250 FOR IP): Performed by: EMERGENCY MEDICINE

## 2020-11-01 RX ORDER — TOBRAMYCIN 3 MG/ML
1 SOLUTION/ DROPS OPHTHALMIC EVERY 6 HOURS
Qty: 1 BOTTLE | Refills: 0 | Status: SHIPPED | OUTPATIENT
Start: 2020-11-01 | End: 2020-11-11

## 2020-11-01 RX ORDER — TETRACAINE HYDROCHLORIDE 5 MG/ML
2 SOLUTION OPHTHALMIC ONCE
Status: COMPLETED | OUTPATIENT
Start: 2020-11-01 | End: 2020-11-01

## 2020-11-01 RX ORDER — LORATADINE 10 MG/1
10 TABLET ORAL DAILY
Qty: 30 TABLET | Refills: 0 | Status: SHIPPED | OUTPATIENT
Start: 2020-11-01 | End: 2020-12-01

## 2020-11-01 RX ADMIN — FLUORESCEIN SODIUM 1 MG: 1 STRIP OPHTHALMIC at 07:51

## 2020-11-01 RX ADMIN — TETRACAINE HYDROCHLORIDE 2 DROP: 5 SOLUTION OPHTHALMIC at 07:52

## 2020-11-01 NOTE — ED PROVIDER NOTES
HPI:  11/1/20, Time: 7:07 AM KLEBER Duarte is a 61 y.o. female presenting to the ED for right eye injection, irritation and clear tearing, beginning yesterday. Now has some upper eyelid swelling on the right side. The complaint has been persistent, moderate in severity, and worsened by nothing. Denies eye trauma, vision changes, headache, FB in eye, eye pressure. Was advised by PCP to come here for evaluation. Has multiple allergies and sensitivities to antibiotics. She does not wear contact lenses. Patient denies fever/chills, sore throat, cough, congestion, chest pain, shortness of breath, edema, headache, visual disturbances, focal paresthesias, focal weakness, abdominal pain, nausea, vomiting, diarrhea, constipation, dysuria, hematuria, trauma, neck or back pain, rash or other complaints. Patient states she does not have an eye doctor. 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 ROS:   A complete review of systems was performed and all pertinent positives and negatives are stated within HPI, all other systems reviewed and are negative.      --------------------------------------------- PAST HISTORY ---------------------------------------------  Past Medical History:  has no past medical history on file. Past Surgical History:  has a past surgical history that includes Meckel's diverticulum excision. Social History:  reports that she has been smoking. She has been smoking about 0.50 packs per day. She has never used smokeless tobacco. She reports that she does not drink alcohol or use drugs. Family History: family history is not on file. The patients home medications have been reviewed.     Allergies: Amoxicillin; Penicillins; Sulfa antibiotics; and Zithromax [azithromycin]        ----------------------------------------PHYSICAL EXAM--------------------------------------  Constitutional:  Well developed, well nourished, no acute distress, non-toxic appearance Eyes:  PERRL, left conjunctiva normal, EOMI without pain, no proptosis. No temporal tenderness. Right conjunctiva injected (spares limbus) with mild swelling/chemosis noted. Right upper eyelid with mild boddy edema without warmth or redness. No FBs noted underneath eyelids when everted. No hyphema. No hypopyon. No corneal abrasions noted of fluorescein uptake. Patient uncooperative with flipping of eyelids. HENT:  Atraumatic, external ears normal, nose normal, oropharynx moist, no pharyngeal exudates. Neck- normal range of motion, no nuchal rigidity   Respiratory:  No respiratory distress,   Cardiovascular:  Normal rate, normal rhythm. Radial  pulses 2+ bilaterally. Musculoskeletal:  No edema,  no deformities. Integument:  Well hydrated, no visible rash. Adequate perfusion. Lymphatic:  No cervical lymphadenopathy noted   Neurologic:  Alert & oriented x 3, CN 2-12 normal, no focal deficits noted. Normal gait. Psychiatric:  Speech and behavior appropriate       -------------------------------------------------- RESULTS -------------------------------------------------  I have personally reviewed all laboratory and imaging results for this patient. Results are listed below. LABS:  No results found for this visit on 11/01/20. RADIOLOGY:  Interpreted by Radiologist.  No orders to display     ------------------------- NURSING NOTES AND VITALS REVIEWED ---------------------------  The nursing notes within the ED encounter and vital signs as below have been reviewed by myself. /72   Pulse 105   Temp 98.9 °F (37.2 °C) (Temporal)   Resp 16   Ht 5' 3\" (1.6 m)   Wt 130 lb (59 kg)   SpO2 96%   BMI 23.03 kg/m²   Oxygen Saturation Interpretation: Normal      The patients available past medical records and past encounters were reviewed.         ------------------------------ ED COURSE/MEDICAL DECISION MAKING----------------------  Medications   Tetanus-Diphth-Acell Pertussis ([address]) injection 0.5 mL (0.5 mLs Intramuscular Not Given 11/1/20 0752)   tetracaine (TETRAVISC) 0.5 % ophthalmic solution 2 drop (2 drops Right Eye Given 11/1/20 0752)   fluorescein ophthalmic strip 1 mg (1 mg Right Eye Given 11/1/20 6671)           Procedures:   none      Medical Decision Making:    Visual acuity done, but not accurate as she did not bring her glasses. Chemosis of right eye - allergic vs infective. Wants tested for covid. Will treat with tobrex eye drop and claritin. Has multiple allergy to ABX and will avoid these for now as they are not indicated for chemosis/conjunctivitis. Patient agrees with no oral antibiotics at this time and return to ER if she develops symptoms/signs of facial or eye cellulitis or sinusitis. No facial or sinus involvement at this time. See eye doctor tomorrow. Driving restrictions reviewed     This patient's ED course included: re-evaluation prior to disposition and a personal history and physicial eaxmination    This patient has remained hemodynamically stable and remained unchanged during their ED course. Re-Evaluations:  Time: 7:42 AM EST   Re-evaluation. Patients symptoms show no change  Repeat physical examination is not changed      Consultations:   none    Critical Care: none    Liat LAW, , am the Primary Provider of Record    Counseling: The emergency provider has spoken with the patient and discussed todays results, in addition to providing specific details for the plan of care and counseling regarding the diagnosis and prognosis. Questions are answered at this time and they are agreeable with the plan.    --------------------------- IMPRESSION AND DISPOSITION ---------------------------------    IMPRESSION  1. Chemosis of right conjunctiva    2.  Acute conjunctivitis of right eye, unspecified acute conjunctivitis type        DISPOSITION  Disposition: Discharge to home  Patient condition is stable             Duane Ours, DO  11/01/20 0646 [facility],   11/01/20 109 [facility],   11/01/20 5844

## 2020-11-01 NOTE — ED NOTES
Patient given discharge paperwork at this time. Patient also given scripts. Patient has no further questions at this time. Nurse provided education to patient. Patient is alert and oriented and VS are stable at this time.       Rosalia Rangel RN  11/01/20 2840

## 2020-11-03 LAB
SARS-COV-2: NOT DETECTED
SOURCE: NORMAL

## 2023-03-17 ENCOUNTER — HOSPITAL ENCOUNTER (EMERGENCY)
Age: 63
Discharge: HOME | End: 2023-03-17
Attending: EMERGENCY MEDICINE

## 2023-03-17 VITALS
BODY MASS INDEX: 23.04 KG/M2 | HEIGHT: 63 IN | DIASTOLIC BLOOD PRESSURE: 84 MMHG | HEART RATE: 104 BPM | SYSTOLIC BLOOD PRESSURE: 158 MMHG | TEMPERATURE: 98.1 F | OXYGEN SATURATION: 95 % | RESPIRATION RATE: 20 BRPM | WEIGHT: 130 LBS

## 2023-03-17 DIAGNOSIS — H02.846 SWELLING OF LEFT EYELID: Primary | ICD-10-CM

## 2023-03-17 PROCEDURE — 6360000002 HC RX W HCPCS: Performed by: EMERGENCY MEDICINE

## 2023-03-17 PROCEDURE — 6370000000 HC RX 637 (ALT 250 FOR IP): Performed by: EMERGENCY MEDICINE

## 2023-03-17 PROCEDURE — 99283 EMERGENCY DEPT VISIT LOW MDM: CPT

## 2023-03-17 RX ORDER — HYDROXYZINE PAMOATE 25 MG/1
25 CAPSULE ORAL 3 TIMES DAILY PRN
Qty: 30 CAPSULE | Refills: 0 | Status: SHIPPED | OUTPATIENT
Start: 2023-03-17 | End: 2023-03-27

## 2023-03-17 RX ORDER — DEXAMETHASONE SODIUM PHOSPHATE 4 MG/ML
10 INJECTION, SOLUTION INTRA-ARTICULAR; INTRALESIONAL; INTRAMUSCULAR; INTRAVENOUS; SOFT TISSUE ONCE
Status: DISCONTINUED | OUTPATIENT
Start: 2023-03-17 | End: 2023-03-17 | Stop reason: HOSPADM

## 2023-03-17 RX ORDER — HYDROXYZINE HYDROCHLORIDE 10 MG/1
25 TABLET, FILM COATED ORAL ONCE
Status: DISCONTINUED | OUTPATIENT
Start: 2023-03-17 | End: 2023-03-17 | Stop reason: HOSPADM

## 2023-03-17 ASSESSMENT — PAIN - FUNCTIONAL ASSESSMENT
PAIN_FUNCTIONAL_ASSESSMENT: NONE - DENIES PAIN

## 2023-03-17 ASSESSMENT — LIFESTYLE VARIABLES
HOW MANY STANDARD DRINKS CONTAINING ALCOHOL DO YOU HAVE ON A TYPICAL DAY: PATIENT DOES NOT DRINK
HOW OFTEN DO YOU HAVE A DRINK CONTAINING ALCOHOL: NEVER

## 2023-03-18 ENCOUNTER — HOSPITAL ENCOUNTER (EMERGENCY)
Age: 63
Discharge: HOME | End: 2023-03-18
Attending: STUDENT IN AN ORGANIZED HEALTH CARE EDUCATION/TRAINING PROGRAM

## 2023-03-18 VITALS
DIASTOLIC BLOOD PRESSURE: 82 MMHG | OXYGEN SATURATION: 98 % | SYSTOLIC BLOOD PRESSURE: 118 MMHG | HEART RATE: 62 BPM | RESPIRATION RATE: 17 BRPM | TEMPERATURE: 98.3 F

## 2023-03-18 DIAGNOSIS — H57.89 PERIORBITAL SWELLING: Primary | ICD-10-CM

## 2023-03-18 PROCEDURE — 99283 EMERGENCY DEPT VISIT LOW MDM: CPT

## 2023-03-18 RX ORDER — METHYLPREDNISOLONE 4 MG/1
TABLET ORAL
Qty: 1 KIT | Refills: 0 | Status: SHIPPED | OUTPATIENT
Start: 2023-03-18 | End: 2023-03-24

## 2023-03-18 ASSESSMENT — ENCOUNTER SYMPTOMS
EYE PAIN: 0
FACIAL SWELLING: 1
EYE REDNESS: 0
VOICE CHANGE: 0
TROUBLE SWALLOWING: 0
SHORTNESS OF BREATH: 0
NAUSEA: 0
EYE ITCHING: 0
VOMITING: 0
SORE THROAT: 0
PHOTOPHOBIA: 0
RHINORRHEA: 0
EYE DISCHARGE: 0
SINUS PAIN: 0
COLOR CHANGE: 0
COUGH: 0
SINUS PRESSURE: 0

## 2023-03-18 ASSESSMENT — PAIN DESCRIPTION - PAIN TYPE: TYPE: ACUTE PAIN

## 2023-03-18 ASSESSMENT — PAIN DESCRIPTION - DESCRIPTORS: DESCRIPTORS: ITCHING;DISCOMFORT

## 2023-03-18 ASSESSMENT — VISUAL ACUITY
OU: 20/50
OD: 20/40
OS: 20/70

## 2023-03-18 ASSESSMENT — PAIN DESCRIPTION - ONSET: ONSET: ON-GOING

## 2023-03-18 ASSESSMENT — PAIN DESCRIPTION - LOCATION: LOCATION: EYE

## 2023-03-18 ASSESSMENT — PAIN SCALES - GENERAL: PAINLEVEL_OUTOF10: 3

## 2023-03-18 ASSESSMENT — PAIN DESCRIPTION - FREQUENCY: FREQUENCY: CONTINUOUS

## 2023-03-18 ASSESSMENT — PAIN - FUNCTIONAL ASSESSMENT: PAIN_FUNCTIONAL_ASSESSMENT: 0-10

## 2023-03-18 ASSESSMENT — PAIN DESCRIPTION - ORIENTATION: ORIENTATION: LEFT

## 2023-03-18 NOTE — ED PROVIDER NOTES
[facility] Emergency Department  ED Provider Note  Department of Emergency Medicine     ED Room:       Written by: Saurav Chester DO  Patient Name: Miley Quinonez  Admit Date: 3/18/2023  4:09 PM  MRN: [id number]    : 1960        Chief Complaint   Patient presents with    Eye Problem     Left area around eye swollen up, was here last night,        HPI   Miley Quinonez is a 58 y.o. female presenting to the ED for evaluation of Eye Problem (Left area around eye swollen up, was here last night, )      History obtained from the patient. Limitations to history : None      Patient is a 57 y/o female presenting to the ED for evaluation of left periorbital edema x2 days; was seen here yesterday for similar symptoms, at the time is was mostly her upper left eyelid and now has spread to her left lower eyelid/cheek region. There is no associated redness, warmth or tenderness. It started after unknown exposure. They have not been relieved with benadryl; she was prescribed vistaril but did not fill the medication stating that she does not really like to take new medications. Denies contact usage, any new medications or recent exposures. Denies any associated eye pain, redness or drainage or changes in vision. She reports the symptoms initially started after taking a nap yesterday and woke up with worsening swelling; then today it gradually worsened. She does report over the last several months she has noticed intermittent hives on her arms and legs that seem to resolve spontaneously, but she has not seen anyone or followed up for these symptoms. She reports no one else in her household has experienced these symptoms. She denies any headache, fevers, neck pain or stiffness, difficulty speaking or swallowing, tongue swelling, cough or sore throat, chest pain palpitations or SOB.        Denies any fevers, neck pain or stiffness, cough or sore throat, chest pain or palpitations, shortness of breath, abdominal pain, nausea or vomiting, diarrhea, black or bloody stools, abnormal urinary symptoms, numbness or tingling anywhere, lower extremity edema or tenderness. Nursing Notes were all reviewed and agreed with or any disagreements were addressed in the HPI. Review of Systems   Constitutional:  Negative for chills and fever. HENT:  Positive for facial swelling (periorbital edema left). Negative for congestion, ear discharge, ear pain, rhinorrhea, sinus pressure, sinus pain, sore throat, trouble swallowing and voice change. Eyes:  Negative for photophobia, pain, discharge, redness, itching and visual disturbance. Respiratory:  Negative for cough and shortness of breath. Cardiovascular:  Negative for chest pain and palpitations. Gastrointestinal:  Negative for nausea and vomiting. Musculoskeletal:  Negative for neck pain and neck stiffness. Skin:  Negative for color change, rash and wound. Neurological:  Negative for dizziness, weakness, numbness and headaches. All other systems reviewed and are negative. Positives and Pertinent negatives as per HPI. Physical Exam  Vitals and nursing note reviewed. Constitutional:       General: She is not in acute distress. Appearance: She is not toxic-appearing. HENT:      Head: Normocephalic and atraumatic. Right Ear: External ear normal.      Left Ear: External ear normal.      Nose: Nose normal. No rhinorrhea. Mouth/Throat:      Mouth: Mucous membranes are moist.      Pharynx: Oropharynx is clear. Eyes:      General: No scleral icterus. Right eye: No discharge. Left eye: No discharge. Extraocular Movements: Extraocular movements intact. Conjunctiva/sclera: Conjunctivae normal.      Pupils: Pupils are equal, round, and reactive to light.       Comments: Moderate periorbital edema on the left, no redness, warmth, tenderness or open wound   Cardiovascular:      Rate and Rhythm: Normal rate and regular rhythm. Pulses: Normal pulses. Heart sounds: Normal heart sounds. Pulmonary:      Effort: Pulmonary effort is normal. No respiratory distress. Breath sounds: Normal breath sounds. No wheezing or rales. Abdominal:      General: Bowel sounds are normal.      Palpations: Abdomen is soft. Tenderness: There is no abdominal tenderness. There is no guarding. Musculoskeletal:         General: Normal range of motion. Cervical back: Normal range of motion and neck supple. No rigidity or tenderness. Right lower leg: No edema. Left lower leg: No edema. Skin:     General: Skin is warm and dry. Capillary Refill: Capillary refill takes less than 2 seconds. Coloration: Skin is not jaundiced or pale. Findings: No bruising, erythema or rash. Neurological:      General: No focal deficit present. Mental Status: She is alert and oriented to person, place, and time. Gait: Gait normal.   Psychiatric:         Mood and Affect: Mood normal.         Behavior: Behavior normal.        ED Triage Vitals [03/18/23 1524]   BP Temp Temp Source Heart Rate Resp SpO2 Height Weight   122/82 98.3 °F (36.8 °C) Oral 55 16 95 % -- --       Procedures        Medical Decision Making/Differential Diagnosis:    CC/HPI Summary, Social Determinants of health, Records Reviewed, DDx, testing done/not done, ED Course, Reassessment, disposition considerations/shared decision making with patient, consults, disposition:      Medical Decision Making:      Vitals:    Vitals:    03/18/23 1524   BP: 122/82   Pulse: 55   Resp: 16   Temp: 98.3 °F (36.8 °C)   TempSrc: Oral   SpO2: 95%       Patient was given the following medications:  Medications - No data to display      Is this patient to be included in the SEP-1 Core Measure due to severe sepsis or septic shock? No Exclusion criteria - the patient is NOT to be included for SEP-1 Core Measure due to:  Infection is not suspected      This is a 58 y.o. female presenting for evaluation of left periorbital edema for about 2 days; was seen in this ED yesterday, prescribed Vistaril but did not take it states she does not like to take medications; did try benadryl but it did not improve her symptoms. Please see HPI for further details, additional history and chart review. On my evaluation today, patient is alert, oriented, NAD, non-toxic in appearance. Vitals are stable. Exam findings are as documented above; there is moderate periorbital edema on the left; no ocular findings, no rashes redness or warmth; oropharynx clear and patent, no stridor. EOMI, PERRLA. Face is non-tender to palpation. No visual field deficits, eye redness or drainage. Suspect allergic reaction, patient will be provided rx for steroids and instructed to fill her rx for vistaril as prescribed yesterday. She has not started any new medications or tried any new makeup or lotions; advised she follow up with her PCP and return for any new or worsening symptoms. She voiced understanding; feel she is stable and appropriate for discharge. Strict return precautions were discussed. While not exhaustive, the following diagnoses and their severity were considered: allergic reaction, contact dermatitis, hives, facial cellulitis. Independent interpretation of Laboratory tests by Martha Goodman DO: none     Independent interpretation of Radiology tests by Martha Goodman DO: none         Non-plain film images such as CT, Ultrasound and MRI are read by the radiologist. Plain radiographic images are visualized and preliminarily interpreted by the ED Provider with the above-noted findings. Interpretation per the Radiologist below, if available at the time of this note are included below. EKG reviewed and interpreted by me, TIME:  This EKG is signed by emergency department physician. An EKG was not performed during this encounter.       Labs & imaging were reviewed and interpreted, see RESULTS. I have personally reviewed all laboratory and imaging results for this patient. Are there any additional factors to consider that affect care (uninsured, homeless, illiterate, history from another source, etc.) (If yes, which ones). No        Re-Evaluations:           Please see ED course for any additional MDM documentation.          --------------------------------------------- PAST HISTORY ---------------------------------------------  Past Medical History:  has no past medical history on file. Past Surgical History:  has a past surgical history that includes Meckel's diverticulum excision. Social History:  reports that she has been smoking cigarettes. She has been smoking an average of .5 packs per day. She has never used smokeless tobacco. She reports that she does not drink alcohol and does not use drugs. Family History: family history is not on file. Unless otherwise noted, family history is non contributory. The patients home medications have been reviewed. Allergies: Amoxicillin, Penicillins, Sulfa antibiotics, and Zithromax [azithromycin]    -------------------------------------------------- RESULTS -------------------------------------------------  Labs:  No results found for this visit on 03/18/23. Radiology:  No orders to display       Interpreted by the radiologist unless otherwise specified.      ------------------------- NURSING NOTES AND VITALS REVIEWED ---------------------------  Date / Time Roomed:  3/18/2023  4:09 PM  ED Bed Assignment:  14/14    The nursing notes within the ED encounter and vital signs as below have been reviewed by myself. /82   Pulse 62   Temp 98.3 °F (36.8 °C) (Oral)   Resp 17   SpO2 98%   Oxygen Saturation Interpretation: Normal    The patients available past medical records and past encounters were reviewed.         ------------------------------------------ PROGRESS NOTES ------------------------------------------  10:32 PM EDT  I have spoken with the patient and discussed todays results, in addition to providing specific details for the plan of care and counseling regarding the diagnosis and prognosis. Their questions are answered at this time and they are agreeable with the plan. I discussed at length with them reasons for immediate return here for re evaluation. They will followup with their primary care physician by calling their office on Monday.      --------------------------------- ADDITIONAL PROVIDER NOTES ---------------------------------  At this time the patient is without objective evidence of an acute process requiring hospitalization or inpatient management. They have remained hemodynamically stable throughout their entire ED visit and are stable for discharge with outpatient follow-up. The plan has been discussed in detail and they are aware of the specific conditions for emergent return, as well as the importance of follow-up. Discharge Medication List as of 3/18/2023  4:45 PM        START taking these medications    Details   methylPREDNISolone (MEDROL, TOSHIA,) 4 MG tablet Take by mouth., Disp-1 kit, R-0Normal             Diagnosis:  1. Periorbital swelling        Disposition:  Patient's disposition: Discharge to home  Patient's condition is stable. Dario Bates D.O. PGY-3     Resident Physician     Emergency Medicine      3/18/2023 4:45 PM      NOTE: This report was transcribed using voice recognition software.  Every effort was made to ensure accuracy; however, inadvertent computerized transcription errors may be present              Dario Bates DO  Resident  03/18/23 6483

## 2023-03-18 NOTE — ED NOTES
Patient refuses Injection of decadron and atarax po.  Dr Mindi Pritchett notified and will send RX to pharmacy for oral atarax      Khadra Washington RN  03/17/23 1904

## 2023-03-18 NOTE — DISCHARGE INSTRUCTIONS
Please call your primary care provider as instructed  Please  your medication prescription from the pharmacy as discussed  Please return to the ER for any new or worsening symptoms including but not limited to worsening facial swelling, eye symptoms, difficulty speaking or swallowing, difficulty breathing, shortness of breath or chest pain

## 2023-03-18 NOTE — ED PROVIDER NOTES
[address] [facility]  Department of Emergency Medicine   ED  Encounter Note  Admit Date/RoomTime: 3/17/2023  8:23 PM  ED Room:     NAME: Yolande Hensley  : 1960  MRN: [id number]     Chief Complaint:  Eye Problem (Woke up with periorbital edema left eye and itching )    History of Present Illness        Yolande Hensley is a 58 y.o. old female who presents to the emergency department by private vehicle, for gradual onset, worsening of left-sided eyelid swelling and facial swelling after unknown exposure to which began several hour(s) prior to arrival.  Since onset the symptoms have been worsening and moderate in severity. Her symptoms are associated with nothing additional and relieved by nothing. She denies any additional symptoms. She reports earlier today she noticed some mild swelling on the left upper eyelid. Denies contact usage, denies new medications or exposures. She states she took a nap this afternoon when she woke up the swelling was significantly worse. Did take Benadryl earlier without relief. States over the last several months she has noticed intermittent hives on her arms and legs that seem to resolve spontaneously, has not followed up for this and no one in her family has experienced the symptoms. ROS   Pertinent positives and negatives are stated within HPI, all other systems reviewed and are negative. Past Medical History:  has no past medical history on file. Surgical History:  has a past surgical history that includes Meckel's diverticulum excision. Social History:  reports that she has been smoking cigarettes. She has been smoking an average of .5 packs per day. She has never used smokeless tobacco. She reports that she does not drink alcohol and does not use drugs. Family History: family history is not on file.      Allergies: Amoxicillin, Penicillins, Sulfa antibiotics, and Zithromax [azithromycin]    Physical Exam   Oxygen Saturation Interpretation: Normal.        ED Triage Vitals   BP Temp Temp Source Heart Rate Resp SpO2 Height Weight   03/17/23 2018 03/17/23 2018 03/17/23 2018 03/17/23 2018 03/17/23 2018 03/17/23 2018 03/17/23 2029 03/17/23 2029   (!) 145/89 98.1 °F (36.7 °C) Oral 94 18 99 % 5' 3\" (1.6 m) 130 lb (59 kg)          General Appearance/Constitutional:  Alert, development consistent with age. HEENT:  NC/NT. PERRLA. Over the left upper lid there is significant periorbital edema. There is no injection or chemosis of the eyeball itself. Painless EOMI. Airway patent. Neck:  Normal ROM. Supple. Respiratory:  Clear to auscultation and breath sounds equal.  CV:  Regular rate and rhythm, normal heart sounds, without pathological murmurs,    GI:  Abdomen Soft, nontender, good bowel sounds. No firm or pulsatile mass. Back:  No costovertebral tenderness. Extremities: No tenderness or edema noted. Integument:  Normal turgor. Warm, dry, without visible rash, unless noted elsewhere. Lymphatics: No lymphangitis or adenopathy noted. Neurological:  Oriented. Motor functions intact. Lab / Imaging Results     (All laboratory and radiology results have been personally reviewed by myself)  Labs:  No results found for this visit on 03/17/23. Imaging: All Radiology results interpreted by Radiologist unless otherwise noted. No orders to display       ED Course / Medical Decision Making     Medications   dexamethasone (DECADRON) Oral 10 mg (has no administration in time range)   hydrOXYzine HCl (ATARAX) tablet 25 mg (has no administration in time range)             MDM:   Medical Decision Making   Azeb Jenkins is a 58 y.o. old female who presents to the emergency department by private vehicle, for gradual onset, worsening of left-sided eyelid swelling and facial swelling after unknown exposure to which began several hour(s) prior to arrival.  Since onset the symptoms have been worsening and moderate in severity.  Her symptoms are associated with nothing additional and relieved by nothing. She denies any additional symptoms. She reports earlier today she noticed some mild swelling on the left upper eyelid. Denies contact usage, denies new medications or exposures. She states she took a nap this afternoon when she woke up the swelling was significantly worse. Did take Benadryl earlier without relief. States over the last several months she has noticed intermittent hives on her arms and legs that seem to resolve spontaneously, has not followed up for this and no one in her family has experienced the symptoms. Medically appears to be allergic reaction. She is unsure of any exposures states she did not put make-up on does not use new facial wash or eyewash. She does give a history of intermittent urticaria over the last few months. The eye itself is spared all the swelling is in the upper left eyelid. Discussed plan for supportive care need for follow-up, needed outpatient allergy testing. Will have return for worsening signs or symptoms. Risk  Prescription drug management. Plan of Care/Counseling:  Elijah Mcpherson DO reviewed today's visit with the patient in addition to providing specific details for the plan of care and counseling regarding the diagnosis and prognosis. Questions are answered at this time and are agreeable with the plan. Assessment      1. Swelling of left eyelid      Plan   Discharged home  Patient condition is stable    New Medications     New Prescriptions    HYDROXYZINE PAMOATE (VISTARIL) 25 MG CAPSULE    Take 1 capsule by mouth 3 times daily as needed for Itching     Electronically signed by Elijah Mcpherson DO   DD: 3/17/23  **This report was transcribed using voice recognition software. Every effort was made to ensure accuracy; however, inadvertent computerized transcription errors may be present.   END OF ED PROVIDER NOTE       Elijah Mcpherson DO  03/17/23 2052

## 2023-03-20 ENCOUNTER — HOSPITAL ENCOUNTER (EMERGENCY)
Age: 63
Discharge: HOME | End: 2023-03-21
Attending: EMERGENCY MEDICINE

## 2023-03-20 VITALS
SYSTOLIC BLOOD PRESSURE: 136 MMHG | DIASTOLIC BLOOD PRESSURE: 76 MMHG | HEART RATE: 100 BPM | RESPIRATION RATE: 18 BRPM | OXYGEN SATURATION: 98 % | TEMPERATURE: 98.2 F

## 2023-03-20 DIAGNOSIS — T78.40XD ALLERGIC REACTION, SUBSEQUENT ENCOUNTER: ICD-10-CM

## 2023-03-20 DIAGNOSIS — R20.2 TINGLING IN EXTREMITIES: Primary | ICD-10-CM

## 2023-03-20 PROCEDURE — 99283 EMERGENCY DEPT VISIT LOW MDM: CPT

## 2023-03-20 RX ORDER — DIPHENHYDRAMINE HCL 25 MG
50 TABLET ORAL ONCE
Status: COMPLETED | OUTPATIENT
Start: 2023-03-21 | End: 2023-03-21

## 2023-03-20 ASSESSMENT — LIFESTYLE VARIABLES
HOW OFTEN DO YOU HAVE A DRINK CONTAINING ALCOHOL: NEVER
HOW MANY STANDARD DRINKS CONTAINING ALCOHOL DO YOU HAVE ON A TYPICAL DAY: PATIENT DOES NOT DRINK

## 2023-03-20 ASSESSMENT — PAIN - FUNCTIONAL ASSESSMENT: PAIN_FUNCTIONAL_ASSESSMENT: NONE - DENIES PAIN

## 2023-03-21 LAB
ANION GAP SERPL CALCULATED.3IONS-SCNC: 12 MMOL/L (ref 7–16)
BUN SERPL-MCNC: 15 MG/DL (ref 6–23)
CALCIUM SERPL-MCNC: 9.8 MG/DL (ref 8.6–10.2)
CHLORIDE SERPL-SCNC: 104 MMOL/L (ref 98–107)
CO2 SERPL-SCNC: 23 MMOL/L (ref 22–29)
CREAT SERPL-MCNC: 0.8 MG/DL (ref 0.5–1)
GLUCOSE SERPL-MCNC: 117 MG/DL (ref 74–99)
MAGNESIUM SERPL-MCNC: 2.2 MG/DL (ref 1.6–2.6)
POTASSIUM SERPL-SCNC: 3.8 MMOL/L (ref 3.5–5)
SODIUM SERPL-SCNC: 139 MMOL/L (ref 132–146)

## 2023-03-21 PROCEDURE — 83735 ASSAY OF MAGNESIUM: CPT

## 2023-03-21 PROCEDURE — 80048 BASIC METABOLIC PNL TOTAL CA: CPT

## 2023-03-21 PROCEDURE — 36415 COLL VENOUS BLD VENIPUNCTURE: CPT

## 2023-03-21 PROCEDURE — 6370000000 HC RX 637 (ALT 250 FOR IP): Performed by: EMERGENCY MEDICINE

## 2023-03-21 RX ADMIN — DIPHENHYDRAMINE HYDROCHLORIDE 50 MG: 25 TABLET ORAL at 00:15

## 2023-03-21 ASSESSMENT — PAIN - FUNCTIONAL ASSESSMENT: PAIN_FUNCTIONAL_ASSESSMENT: NONE - DENIES PAIN

## 2023-03-21 NOTE — ED PROVIDER NOTES
HPI:  3/20/23,   Time: 11:57 PM EDT       Froylan Carcamo is a 58 y.o. female presenting to the ED for tingling in fingers and throat feeling funny, beginning hrs ago. The complaint has been intermittent, mild in severity, and worsened by nothing. Third visit in past 4 days for allergic reaction symptoms. Patient was with eye swelling. Was taking Benadryl OTC. Was seen 2 days ago given steroids. Patient has not taken steroids. Patient saw PCP today and was told to take Zyrtec. Took first dose at 6 PM.  Thinks having reaction that. Tingling in hands. Also feel funny feeling in throat. Brought in by private vehicle. Nothing makes better. Review of Systems:   Pertinent positives and negatives are stated within HPI, all other systems reviewed and are negative.          --------------------------------------------- PAST HISTORY ---------------------------------------------  Past Medical History:  has no past medical history on file. Past Surgical History:  has a past surgical history that includes Meckel's diverticulum excision. Social History:  reports that she has been smoking cigarettes. She has been smoking an average of .5 packs per day. She has never used smokeless tobacco. She reports that she does not drink alcohol and does not use drugs. Family History: family history is not on file. The patients home medications have been reviewed.     Allergies: Amoxicillin, Penicillins, Sulfa antibiotics, and Zithromax [azithromycin]        ---------------------------------------------------PHYSICAL EXAM--------------------------------------    Constitutional/General: Alert and oriented x3, well appearing, non toxic in NAD  Head: Normocephalic and atraumatic  Eyes: PERRL, EOMI, conjunctive normal, sclera non icteric  Mouth: Oropharynx clear, handling secretions, no trismus, no asymmetry of the posterior oropharynx or uvular edema  Neck: Supple, full ROM,   Respiratory: Lungs clear to

## 2023-09-02 ENCOUNTER — HOSPITAL ENCOUNTER (EMERGENCY)
Age: 63
Discharge: HOME | End: 2023-09-02
Attending: EMERGENCY MEDICINE

## 2023-09-02 VITALS
BODY MASS INDEX: 23.03 KG/M2 | TEMPERATURE: 98.6 F | WEIGHT: 130 LBS | OXYGEN SATURATION: 94 % | HEART RATE: 102 BPM | DIASTOLIC BLOOD PRESSURE: 84 MMHG | RESPIRATION RATE: 20 BRPM | SYSTOLIC BLOOD PRESSURE: 132 MMHG

## 2023-09-02 DIAGNOSIS — S29.012A STRAIN OF RHOMBOID MUSCLE, INITIAL ENCOUNTER: Primary | ICD-10-CM

## 2023-09-02 PROCEDURE — 6360000002 HC RX W HCPCS: Performed by: EMERGENCY MEDICINE

## 2023-09-02 PROCEDURE — 96372 THER/PROPH/DIAG INJ SC/IM: CPT

## 2023-09-02 PROCEDURE — 99284 EMERGENCY DEPT VISIT MOD MDM: CPT

## 2023-09-02 RX ORDER — IBUPROFEN 600 MG/1
600 TABLET ORAL EVERY 8 HOURS PRN
Qty: 15 TABLET | Refills: 0 | Status: SHIPPED | OUTPATIENT
Start: 2023-09-02 | End: 2023-09-07

## 2023-09-02 RX ORDER — KETOROLAC TROMETHAMINE 30 MG/ML
15 INJECTION, SOLUTION INTRAMUSCULAR; INTRAVENOUS ONCE
Status: COMPLETED | OUTPATIENT
Start: 2023-09-02 | End: 2023-09-02

## 2023-09-02 RX ADMIN — KETOROLAC TROMETHAMINE 15 MG: 30 INJECTION, SOLUTION INTRAMUSCULAR; INTRAVENOUS at 22:29

## 2023-09-06 ENCOUNTER — APPOINTMENT (OUTPATIENT)
Dept: CT IMAGING | Age: 63
End: 2023-09-06

## 2023-09-06 ENCOUNTER — APPOINTMENT (OUTPATIENT)
Dept: GENERAL RADIOLOGY | Age: 63
End: 2023-09-06

## 2023-09-06 ENCOUNTER — HOSPITAL ENCOUNTER (EMERGENCY)
Age: 63
Discharge: HOME | End: 2023-09-06
Attending: EMERGENCY MEDICINE

## 2023-09-06 VITALS
BODY MASS INDEX: 23.04 KG/M2 | HEIGHT: 63 IN | SYSTOLIC BLOOD PRESSURE: 139 MMHG | RESPIRATION RATE: 16 BRPM | DIASTOLIC BLOOD PRESSURE: 94 MMHG | TEMPERATURE: 98 F | HEART RATE: 95 BPM | WEIGHT: 130 LBS | OXYGEN SATURATION: 96 %

## 2023-09-06 DIAGNOSIS — M54.10 RADICULOPATHY, UNSPECIFIED SPINAL REGION: ICD-10-CM

## 2023-09-06 DIAGNOSIS — S29.019D THORACIC MYOFASCIAL STRAIN, SUBSEQUENT ENCOUNTER: Primary | ICD-10-CM

## 2023-09-06 DIAGNOSIS — S43.402A SPRAIN OF LEFT SHOULDER, UNSPECIFIED SHOULDER SPRAIN TYPE, INITIAL ENCOUNTER: ICD-10-CM

## 2023-09-06 PROBLEM — S29.019A THORACIC MYOFASCIAL STRAIN: Status: ACTIVE | Noted: 2023-09-06

## 2023-09-06 LAB
ALBUMIN SERPL-MCNC: 4.5 G/DL (ref 3.5–5.2)
ALP SERPL-CCNC: 75 U/L (ref 35–104)
ALT SERPL-CCNC: 15 U/L (ref 0–32)
AMYLASE SERPL-CCNC: 59 U/L (ref 20–100)
ANION GAP SERPL CALCULATED.3IONS-SCNC: 10 MMOL/L (ref 7–16)
AST SERPL-CCNC: 17 U/L (ref 0–31)
BILIRUB DIRECT SERPL-MCNC: <0.2 MG/DL (ref 0–0.3)
BILIRUB INDIRECT SERPL-MCNC: NORMAL MG/DL (ref 0–1)
BILIRUB SERPL-MCNC: 0.3 MG/DL (ref 0–1.2)
BNP SERPL-MCNC: 56 PG/ML (ref 0–125)
BUN SERPL-MCNC: 17 MG/DL (ref 6–23)
CALCIUM SERPL-MCNC: 9.5 MG/DL (ref 8.6–10.2)
CHLORIDE SERPL-SCNC: 105 MMOL/L (ref 98–107)
CK SERPL-CCNC: 262 U/L (ref 20–180)
CO2 SERPL-SCNC: 25 MMOL/L (ref 22–29)
CREAT SERPL-MCNC: 0.9 MG/DL (ref 0.5–1)
D DIMER: <200 NG/ML DDU (ref 0–232)
EKG ATRIAL RATE: 88 BPM
EKG P AXIS: 70 DEGREES
EKG P-R INTERVAL: 142 MS
EKG Q-T INTERVAL: 370 MS
EKG QRS DURATION: 82 MS
EKG QTC CALCULATION (BAZETT): 447 MS
EKG R AXIS: 51 DEGREES
EKG T AXIS: 38 DEGREES
EKG VENTRICULAR RATE: 88 BPM
ERYTHROCYTE [DISTWIDTH] IN BLOOD BY AUTOMATED COUNT: 14.5 % (ref 11.5–15)
GFR SERPL CREATININE-BSD FRML MDRD: >60 ML/MIN/1.73M2
GLUCOSE SERPL-MCNC: 113 MG/DL (ref 74–99)
HCT VFR BLD AUTO: 41.1 % (ref 34–48)
HGB BLD-MCNC: 13.8 G/DL (ref 11.5–15.5)
INR PPP: 1
LIPASE SERPL-CCNC: 36 U/L (ref 13–60)
MAGNESIUM SERPL-MCNC: 2.2 MG/DL (ref 1.6–2.6)
MCH RBC QN AUTO: 30.9 PG (ref 26–35)
MCHC RBC AUTO-ENTMCNC: 33.6 G/DL (ref 32–34.5)
MCV RBC AUTO: 91.9 FL (ref 80–99.9)
PLATELET # BLD AUTO: 324 K/UL (ref 130–450)
PMV BLD AUTO: 9.6 FL (ref 7–12)
POTASSIUM SERPL-SCNC: 4.1 MMOL/L (ref 3.5–5)
PROT SERPL-MCNC: 7.4 G/DL (ref 6.4–8.3)
PROTHROMBIN TIME: 11.3 SEC (ref 9.3–12.4)
RBC # BLD AUTO: 4.47 M/UL (ref 3.5–5.5)
SODIUM SERPL-SCNC: 140 MMOL/L (ref 132–146)
TROPONIN I SERPL HS-MCNC: <6 NG/L (ref 0–9)
WBC OTHER # BLD: 8.8 K/UL (ref 4.5–11.5)

## 2023-09-06 PROCEDURE — 72125 CT NECK SPINE W/O DYE: CPT

## 2023-09-06 PROCEDURE — 73030 X-RAY EXAM OF SHOULDER: CPT

## 2023-09-06 PROCEDURE — 96375 TX/PRO/DX INJ NEW DRUG ADDON: CPT

## 2023-09-06 PROCEDURE — 70450 CT HEAD/BRAIN W/O DYE: CPT

## 2023-09-06 PROCEDURE — 85379 FIBRIN DEGRADATION QUANT: CPT

## 2023-09-06 PROCEDURE — 71250 CT THORAX DX C-: CPT

## 2023-09-06 PROCEDURE — 80053 COMPREHEN METABOLIC PANEL: CPT

## 2023-09-06 PROCEDURE — 83735 ASSAY OF MAGNESIUM: CPT

## 2023-09-06 PROCEDURE — 82248 BILIRUBIN DIRECT: CPT

## 2023-09-06 PROCEDURE — 83880 ASSAY OF NATRIURETIC PEPTIDE: CPT

## 2023-09-06 PROCEDURE — 72128 CT CHEST SPINE W/O DYE: CPT

## 2023-09-06 PROCEDURE — 84484 ASSAY OF TROPONIN QUANT: CPT

## 2023-09-06 PROCEDURE — 82550 ASSAY OF CK (CPK): CPT

## 2023-09-06 PROCEDURE — 82150 ASSAY OF AMYLASE: CPT

## 2023-09-06 PROCEDURE — 83690 ASSAY OF LIPASE: CPT

## 2023-09-06 PROCEDURE — 85027 COMPLETE CBC AUTOMATED: CPT

## 2023-09-06 PROCEDURE — 93010 ELECTROCARDIOGRAM REPORT: CPT | Performed by: INTERNAL MEDICINE

## 2023-09-06 PROCEDURE — 96374 THER/PROPH/DIAG INJ IV PUSH: CPT

## 2023-09-06 PROCEDURE — 85610 PROTHROMBIN TIME: CPT

## 2023-09-06 PROCEDURE — 93005 ELECTROCARDIOGRAM TRACING: CPT | Performed by: EMERGENCY MEDICINE

## 2023-09-06 PROCEDURE — 6360000002 HC RX W HCPCS: Performed by: EMERGENCY MEDICINE

## 2023-09-06 PROCEDURE — 99285 EMERGENCY DEPT VISIT HI MDM: CPT

## 2023-09-06 RX ORDER — DEXAMETHASONE SODIUM PHOSPHATE 10 MG/ML
10 INJECTION INTRAMUSCULAR; INTRAVENOUS ONCE
Status: COMPLETED | OUTPATIENT
Start: 2023-09-06 | End: 2023-09-06

## 2023-09-06 RX ORDER — METHYLPREDNISOLONE 4 MG/1
TABLET ORAL
Qty: 1 KIT | Status: SHIPPED | OUTPATIENT
Start: 2023-09-06 | End: 2023-09-12

## 2023-09-06 RX ORDER — LORAZEPAM 2 MG/ML
1 INJECTION INTRAMUSCULAR ONCE
Status: COMPLETED | OUTPATIENT
Start: 2023-09-06 | End: 2023-09-06

## 2023-09-06 RX ADMIN — DEXAMETHASONE SODIUM PHOSPHATE 10 MG: 10 INJECTION INTRAMUSCULAR; INTRAVENOUS at 01:04

## 2023-09-06 RX ADMIN — LORAZEPAM 1 MG: 2 INJECTION INTRAMUSCULAR; INTRAVENOUS at 01:30

## 2023-09-06 ASSESSMENT — PAIN DESCRIPTION - FREQUENCY: FREQUENCY: CONTINUOUS

## 2023-09-06 ASSESSMENT — PAIN DESCRIPTION - LOCATION: LOCATION: BACK

## 2023-09-06 ASSESSMENT — PAIN DESCRIPTION - PAIN TYPE: TYPE: ACUTE PAIN

## 2023-09-06 ASSESSMENT — PAIN DESCRIPTION - DESCRIPTORS: DESCRIPTORS: ACHING

## 2023-09-06 ASSESSMENT — PAIN DESCRIPTION - ONSET: ONSET: ON-GOING

## 2023-09-06 ASSESSMENT — PAIN SCALES - GENERAL: PAINLEVEL_OUTOF10: 7

## 2023-09-06 NOTE — DISCHARGE INSTRUCTIONS
Turn if any chest pain shortness of breath or any new neurodeficits you should get MRI of your neck back and had a soon as possible

## 2023-09-07 ENCOUNTER — HOSPITAL ENCOUNTER (EMERGENCY)
Age: 63
Discharge: HOME | End: 2023-09-07

## 2023-09-07 VITALS
OXYGEN SATURATION: 98 % | HEART RATE: 94 BPM | DIASTOLIC BLOOD PRESSURE: 78 MMHG | RESPIRATION RATE: 18 BRPM | SYSTOLIC BLOOD PRESSURE: 137 MMHG | TEMPERATURE: 98 F

## 2023-09-07 DIAGNOSIS — T88.7XXA MEDICATION SIDE EFFECT: Primary | ICD-10-CM

## 2023-09-07 LAB — GLUCOSE BLD-MCNC: 121 MG/DL (ref 74–99)

## 2023-09-07 PROCEDURE — 99282 EMERGENCY DEPT VISIT SF MDM: CPT

## 2023-09-07 PROCEDURE — 82962 GLUCOSE BLOOD TEST: CPT

## 2023-09-07 ASSESSMENT — PAIN - FUNCTIONAL ASSESSMENT: PAIN_FUNCTIONAL_ASSESSMENT: NONE - DENIES PAIN

## 2024-05-25 ENCOUNTER — HOSPITAL ENCOUNTER (EMERGENCY)
Age: 64
Discharge: HOME | End: 2024-05-25
Attending: EMERGENCY MEDICINE

## 2024-05-25 ENCOUNTER — APPOINTMENT (OUTPATIENT)
Dept: CT IMAGING | Age: 64
End: 2024-05-25

## 2024-05-25 VITALS
RESPIRATION RATE: 16 BRPM | HEART RATE: 78 BPM | DIASTOLIC BLOOD PRESSURE: 78 MMHG | TEMPERATURE: 97.6 F | OXYGEN SATURATION: 98 % | SYSTOLIC BLOOD PRESSURE: 126 MMHG

## 2024-05-25 DIAGNOSIS — R10.9 ABDOMINAL PAIN, UNSPECIFIED ABDOMINAL LOCATION: Primary | ICD-10-CM

## 2024-05-25 LAB
ALBUMIN SERPL-MCNC: 4.5 G/DL (ref 3.5–5.2)
ALP SERPL-CCNC: 77 U/L (ref 35–104)
ALT SERPL-CCNC: 21 U/L (ref 0–32)
AMYLASE SERPL-CCNC: 61 U/L (ref 20–100)
ANION GAP SERPL CALCULATED.3IONS-SCNC: 14 MMOL/L (ref 7–16)
AST SERPL-CCNC: 33 U/L (ref 0–31)
BILIRUB SERPL-MCNC: 0.2 MG/DL (ref 0–1.2)
BILIRUB UR QL STRIP: NEGATIVE
BUN SERPL-MCNC: 16 MG/DL (ref 6–23)
CALCIUM SERPL-MCNC: 9.3 MG/DL (ref 8.6–10.2)
CHLORIDE SERPL-SCNC: 102 MMOL/L (ref 98–107)
CLARITY UR: CLEAR
CO2 SERPL-SCNC: 22 MMOL/L (ref 22–29)
COLOR UR: YELLOW
CREAT SERPL-MCNC: 0.8 MG/DL (ref 0.5–1)
ERYTHROCYTE [DISTWIDTH] IN BLOOD BY AUTOMATED COUNT: 13.8 % (ref 11.5–15)
GFR, ESTIMATED: 83 ML/MIN/1.73M2
GLUCOSE SERPL-MCNC: 93 MG/DL (ref 74–99)
GLUCOSE UR STRIP-MCNC: NEGATIVE MG/DL
HCT VFR BLD AUTO: 42.8 % (ref 34–48)
HGB BLD-MCNC: 14.4 G/DL (ref 11.5–15.5)
HGB UR QL STRIP.AUTO: ABNORMAL
KETONES UR STRIP-MCNC: NEGATIVE MG/DL
LACTATE BLDV-SCNC: 0.8 MMOL/L (ref 0.5–2.2)
LEUKOCYTE ESTERASE UR QL STRIP: NEGATIVE
LIPASE SERPL-CCNC: 38 U/L (ref 13–60)
MAGNESIUM SERPL-MCNC: 2.2 MG/DL (ref 1.6–2.6)
MCH RBC QN AUTO: 31.2 PG (ref 26–35)
MCHC RBC AUTO-ENTMCNC: 33.6 G/DL (ref 32–34.5)
MCV RBC AUTO: 92.8 FL (ref 80–99.9)
NITRITE UR QL STRIP: NEGATIVE
PH UR STRIP: 5.5 [PH] (ref 5–9)
PLATELET # BLD AUTO: 301 K/UL (ref 130–450)
PMV BLD AUTO: 10 FL (ref 7–12)
POTASSIUM SERPL-SCNC: 4.9 MMOL/L (ref 3.5–5)
PROT SERPL-MCNC: 7.7 G/DL (ref 6.4–8.3)
PROT UR STRIP-MCNC: NEGATIVE MG/DL
RBC # BLD AUTO: 4.61 M/UL (ref 3.5–5.5)
RBC #/AREA URNS HPF: ABNORMAL /HPF
SODIUM SERPL-SCNC: 138 MMOL/L (ref 132–146)
SP GR UR STRIP: 1.01 (ref 1–1.03)
UROBILINOGEN UR STRIP-ACNC: 0.2 EU/DL (ref 0–1)
WBC #/AREA URNS HPF: ABNORMAL /HPF
WBC OTHER # BLD: 8.8 K/UL (ref 4.5–11.5)

## 2024-05-25 PROCEDURE — 81001 URINALYSIS AUTO W/SCOPE: CPT

## 2024-05-25 PROCEDURE — 83690 ASSAY OF LIPASE: CPT

## 2024-05-25 PROCEDURE — 2580000003 HC RX 258: Performed by: EMERGENCY MEDICINE

## 2024-05-25 PROCEDURE — 74176 CT ABD & PELVIS W/O CONTRAST: CPT

## 2024-05-25 PROCEDURE — 80053 COMPREHEN METABOLIC PANEL: CPT

## 2024-05-25 PROCEDURE — 83605 ASSAY OF LACTIC ACID: CPT

## 2024-05-25 PROCEDURE — 82150 ASSAY OF AMYLASE: CPT

## 2024-05-25 PROCEDURE — 99284 EMERGENCY DEPT VISIT MOD MDM: CPT

## 2024-05-25 PROCEDURE — 83735 ASSAY OF MAGNESIUM: CPT

## 2024-05-25 PROCEDURE — 85027 COMPLETE CBC AUTOMATED: CPT

## 2024-05-25 RX ORDER — SODIUM CHLORIDE 9 MG/ML
INJECTION, SOLUTION INTRAVENOUS CONTINUOUS
Status: DISCONTINUED | OUTPATIENT
Start: 2024-05-25 | End: 2024-05-25 | Stop reason: HOSPADM

## 2024-05-25 RX ADMIN — SODIUM CHLORIDE: 9 INJECTION, SOLUTION INTRAVENOUS at 18:51

## 2024-05-25 ASSESSMENT — PAIN SCALES - GENERAL: PAINLEVEL_OUTOF10: 10

## 2024-05-25 ASSESSMENT — PAIN DESCRIPTION - ONSET: ONSET: ON-GOING

## 2024-05-25 ASSESSMENT — PAIN DESCRIPTION - LOCATION: LOCATION: ABDOMEN

## 2024-05-25 ASSESSMENT — PAIN - FUNCTIONAL ASSESSMENT
PAIN_FUNCTIONAL_ASSESSMENT: 0-10
PAIN_FUNCTIONAL_ASSESSMENT: NONE - DENIES PAIN

## 2024-05-25 ASSESSMENT — PAIN DESCRIPTION - FREQUENCY: FREQUENCY: CONTINUOUS

## 2024-05-25 ASSESSMENT — PAIN DESCRIPTION - PAIN TYPE: TYPE: ACUTE PAIN

## 2024-05-25 ASSESSMENT — PAIN DESCRIPTION - DESCRIPTORS: DESCRIPTORS: BURNING;PRESSURE;CRAMPING

## 2024-05-25 NOTE — ED PROVIDER NOTES
HPI:  5/25/24,   Time: 7:15 PM EDT         Deidre Falk is a 64 y.o. female presenting to the ED for lower abdominal pain and constipation, beginning 1 week ago.  The complaint has been persistent, moderate in severity, and worsened by nothing.  Patient has no chest pain no trouble breathing she is been passing gas she is also had diarrhea initially we are to get give her fluids we will get a CT of her abdomen pelvis as well as check her urine she is been urinating normally her vital signs are stable her rectal exam was brown stool heme-negative there was no sign obstipation or constipation or stool noted her abdomen is relatively benign she has no flank pain    ROS:   Pertinent positives and negatives are stated within HPI, all other systems reviewed and are negative.  --------------------------------------------- PAST HISTORY ---------------------------------------------  Past Medical History:  has no past medical history on file.    Past Surgical History:  has a past surgical history that includes Meckel's diverticulum excision.    Social History:  reports that she has been smoking cigarettes. She has never used smokeless tobacco. She reports that she does not drink alcohol and does not use drugs.    Family History: family history is not on file.     The patient’s home medications have been reviewed.    Allergies: Amoxicillin, Doxycycline, Penicillins, Sulfa antibiotics, and Zithromax [azithromycin]    -------------------------------------------------- RESULTS -------------------------------------------------  All laboratory and radiology results have been personally reviewed by myself   LABS:  Results for orders placed or performed during the hospital encounter of 05/25/24   CBC   Result Value Ref Range    WBC 8.8 4.5 - 11.5 k/uL    RBC 4.61 3.50 - 5.50 m/uL    Hemoglobin 14.4 11.5 - 15.5 g/dL    Hematocrit 42.8 34.0 - 48.0 %    MCV 92.8 80.0 - 99.9 fL    MCH 31.2 26.0 - 35.0 pg    MCHC 33.6 32.0 - 34.5

## 2025-02-11 ENCOUNTER — HOSPITAL ENCOUNTER (EMERGENCY)
Age: 65
Discharge: LEFT BEFORE DISCHARGE | End: 2025-02-11

## 2025-02-11 VITALS
TEMPERATURE: 97.9 F | DIASTOLIC BLOOD PRESSURE: 89 MMHG | SYSTOLIC BLOOD PRESSURE: 146 MMHG | RESPIRATION RATE: 18 BRPM | HEIGHT: 63 IN | BODY MASS INDEX: 23.03 KG/M2 | HEART RATE: 96 BPM | OXYGEN SATURATION: 97 %

## 2025-05-04 ENCOUNTER — APPOINTMENT (OUTPATIENT)
Dept: GENERAL RADIOLOGY | Age: 65
End: 2025-05-04
Payer: MEDICARE

## 2025-05-04 ENCOUNTER — HOSPITAL ENCOUNTER (EMERGENCY)
Age: 65
Discharge: LEFT BEFORE DISCHARGE | End: 2025-05-04
Attending: EMERGENCY MEDICINE
Payer: MEDICARE

## 2025-05-04 VITALS
TEMPERATURE: 98.2 F | RESPIRATION RATE: 16 BRPM | HEART RATE: 90 BPM | DIASTOLIC BLOOD PRESSURE: 100 MMHG | BODY MASS INDEX: 21.26 KG/M2 | OXYGEN SATURATION: 100 % | WEIGHT: 120 LBS | SYSTOLIC BLOOD PRESSURE: 140 MMHG

## 2025-05-04 DIAGNOSIS — R07.9 CHEST PAIN, UNSPECIFIED TYPE: Primary | ICD-10-CM

## 2025-05-04 LAB
ALBUMIN SERPL-MCNC: 4.4 G/DL (ref 3.5–5.2)
ALP SERPL-CCNC: 86 U/L (ref 35–104)
ALT SERPL-CCNC: 12 U/L (ref 0–32)
ANION GAP SERPL CALCULATED.3IONS-SCNC: 14 MMOL/L (ref 7–16)
AST SERPL-CCNC: 19 U/L (ref 0–31)
BASOPHILS # BLD: 0.09 K/UL (ref 0–0.2)
BASOPHILS NFR BLD: 1 % (ref 0–2)
BILIRUB SERPL-MCNC: 0.2 MG/DL (ref 0–1.2)
BUN SERPL-MCNC: 13 MG/DL (ref 6–23)
CALCIUM SERPL-MCNC: 9.5 MG/DL (ref 8.6–10.2)
CHLORIDE SERPL-SCNC: 105 MMOL/L (ref 98–107)
CHOLEST SERPL-MCNC: 228 MG/DL
CO2 SERPL-SCNC: 23 MMOL/L (ref 22–29)
CREAT SERPL-MCNC: 0.8 MG/DL (ref 0.5–1)
D-DIMER QUANTITATIVE: <200 NG/ML DDU (ref 0–230)
EOSINOPHIL # BLD: 0.13 K/UL (ref 0.05–0.5)
EOSINOPHILS RELATIVE PERCENT: 2 % (ref 0–6)
ERYTHROCYTE [DISTWIDTH] IN BLOOD BY AUTOMATED COUNT: 13.3 % (ref 11.5–15)
GFR, ESTIMATED: 85 ML/MIN/1.73M2
GLUCOSE SERPL-MCNC: 83 MG/DL (ref 74–99)
HCT VFR BLD AUTO: 41.2 % (ref 34–48)
HDLC SERPL-MCNC: 72 MG/DL
HGB BLD-MCNC: 13.9 G/DL (ref 11.5–15.5)
IMM GRANULOCYTES # BLD AUTO: <0.03 K/UL (ref 0–0.58)
IMM GRANULOCYTES NFR BLD: 0 % (ref 0–5)
INR PPP: 1
LDLC SERPL CALC-MCNC: 119 MG/DL
LYMPHOCYTES NFR BLD: 2.5 K/UL (ref 1.5–4)
LYMPHOCYTES RELATIVE PERCENT: 34 % (ref 20–42)
MCH RBC QN AUTO: 30.9 PG (ref 26–35)
MCHC RBC AUTO-ENTMCNC: 33.7 G/DL (ref 32–34.5)
MCV RBC AUTO: 91.6 FL (ref 80–99.9)
MONOCYTES NFR BLD: 0.54 K/UL (ref 0.1–0.95)
MONOCYTES NFR BLD: 7 % (ref 2–12)
NEUTROPHILS NFR BLD: 56 % (ref 43–80)
NEUTS SEG NFR BLD: 4.12 K/UL (ref 1.8–7.3)
PARTIAL THROMBOPLASTIN TIME: 35.9 SEC (ref 24.5–35.1)
PLATELET # BLD AUTO: 336 K/UL (ref 130–450)
PMV BLD AUTO: 9.5 FL (ref 7–12)
POTASSIUM SERPL-SCNC: 3.9 MMOL/L (ref 3.5–5)
PROT SERPL-MCNC: 7.6 G/DL (ref 6.4–8.3)
PROTHROMBIN TIME: 10.8 SEC (ref 9.3–12.4)
RBC # BLD AUTO: 4.5 M/UL (ref 3.5–5.5)
SODIUM SERPL-SCNC: 142 MMOL/L (ref 132–146)
TRIGL SERPL-MCNC: 190 MG/DL
TROPONIN I SERPL HS-MCNC: <6 NG/L (ref 0–14)
TROPONIN I SERPL HS-MCNC: <6 NG/L (ref 0–14)
VLDLC SERPL CALC-MCNC: 38 MG/DL
WBC OTHER # BLD: 7.4 K/UL (ref 4.5–11.5)

## 2025-05-04 PROCEDURE — 80053 COMPREHEN METABOLIC PANEL: CPT

## 2025-05-04 PROCEDURE — 6370000000 HC RX 637 (ALT 250 FOR IP): Performed by: EMERGENCY MEDICINE

## 2025-05-04 PROCEDURE — 85610 PROTHROMBIN TIME: CPT

## 2025-05-04 PROCEDURE — 85379 FIBRIN DEGRADATION QUANT: CPT

## 2025-05-04 PROCEDURE — 93005 ELECTROCARDIOGRAM TRACING: CPT | Performed by: EMERGENCY MEDICINE

## 2025-05-04 PROCEDURE — 71046 X-RAY EXAM CHEST 2 VIEWS: CPT

## 2025-05-04 PROCEDURE — 85730 THROMBOPLASTIN TIME PARTIAL: CPT

## 2025-05-04 PROCEDURE — 99285 EMERGENCY DEPT VISIT HI MDM: CPT

## 2025-05-04 PROCEDURE — 80061 LIPID PANEL: CPT

## 2025-05-04 PROCEDURE — 85025 COMPLETE CBC W/AUTO DIFF WBC: CPT

## 2025-05-04 PROCEDURE — 84484 ASSAY OF TROPONIN QUANT: CPT

## 2025-05-04 RX ORDER — NITROGLYCERIN 0.4 MG/1
0.4 TABLET SUBLINGUAL
Status: COMPLETED | OUTPATIENT
Start: 2025-05-04 | End: 2025-05-04

## 2025-05-04 RX ORDER — ASPIRIN 325 MG
325 TABLET ORAL ONCE
Status: COMPLETED | OUTPATIENT
Start: 2025-05-04 | End: 2025-05-04

## 2025-05-04 RX ADMIN — ASPIRIN 325 MG: 325 TABLET ORAL at 16:34

## 2025-05-04 RX ADMIN — NITROGLYCERIN 0.4 MG: 0.4 TABLET, ORALLY DISINTEGRATING SUBLINGUAL at 16:34

## 2025-05-04 ASSESSMENT — PAIN DESCRIPTION - FREQUENCY: FREQUENCY: CONTINUOUS

## 2025-05-04 ASSESSMENT — PAIN DESCRIPTION - LOCATION
LOCATION: CHEST
LOCATION: CHEST;ARM;SHOULDER

## 2025-05-04 ASSESSMENT — PAIN DESCRIPTION - DESCRIPTORS
DESCRIPTORS: ACHING;DISCOMFORT;TENDER
DESCRIPTORS: DISCOMFORT;ACHING

## 2025-05-04 ASSESSMENT — PAIN - FUNCTIONAL ASSESSMENT: PAIN_FUNCTIONAL_ASSESSMENT: 0-10

## 2025-05-04 ASSESSMENT — PAIN SCALES - GENERAL
PAINLEVEL_OUTOF10: 3
PAINLEVEL_OUTOF10: 3

## 2025-05-04 ASSESSMENT — PAIN DESCRIPTION - ONSET: ONSET: ON-GOING

## 2025-05-04 ASSESSMENT — PAIN DESCRIPTION - ORIENTATION: ORIENTATION: LEFT

## 2025-05-04 ASSESSMENT — PAIN DESCRIPTION - PAIN TYPE: TYPE: ACUTE PAIN

## 2025-05-04 NOTE — ED NOTES
This patient has chosen to leave against medical advice.  I have personally explained to them that choosing to do so may result in permanent bodily harm or death.  I discussed at length that without further evaluation and monitoring there may be unforeseen circumstances and deterioration resulting in permanent bodily harm or death as a result of their choice.    They are alert, oriented, and competent at this time.  They state that they are aware of the serious risks as explained, but they continue to wish to leave against medical advice.    In light of their decision to leave against medical advice, follow-up has been arranged and they are aware of the importance of following up as instructed.  They have been advised that they should return to the ED immediately if they change their mind at any time, or if their condition begins to change or worsen.    I told the patient that it was high risk of death and morbidity by leaving AGAINST MEDICAL ADVICE she is competent sober and stable acceptable risk of morbidity mortality explained I am fat and for size to her there was very high risk by leaving and she excepted the risk and told her she could return     Mayank Claire MD  05/04/25 1951

## 2025-05-04 NOTE — ED NOTES
Patient educated on the importance of being admitted. Patient wanted to leave AMA. Patient has agreed to be admitted.

## 2025-05-04 NOTE — ED PROVIDER NOTES
[facility] EMERGENCY DEPARTMENT  EMERGENCY DEPARTMENT ENCOUNTER        Pt Name: Deidre Falk  MRN: [id number]  Birthdate 1960  Date of evaluation: 2025  Provider: Trenton Garcia DO  PCP: Maci Dowd APRN - CNP  Note Started: 4:12 PM EDT 25    CHIEF COMPLAINT       Chief Complaint   Patient presents with    Chest Pain     Pain in chest, left shoulder, left side of breast \"for weeks.\"       HISTORY OF PRESENT ILLNESS: 1 or more Elements     History from : Patient    Limitations to history : None    Deidre Falk is a 64 y.o. female who presents intermittent left-sided chest pain and left shoulder pain.  She notes pain is anterior lateral chest.  She states is not better or worse with anything.  She is a smoker.  She smokes about half pack per day.  She states her father  from heart attack and also had cerebrovascular disease.  She is not on any prescribed medication.    PCP [facility]    Nursing Notes were all reviewed and agreed with or any disagreements were addressed in the HPI.    REVIEW OF SYSTEMS :      Review of Systems   Cardiovascular:  Positive for chest pain.       Positives and Pertinent negatives as per HPI.     SURGICAL HISTORY     Past Surgical History:   Procedure Laterality Date    MECKEL DIVERTICULUM EXCISION         CURRENTMEDICATIONS       Previous Medications    No medications on file       ALLERGIES     Amoxicillin, Doxycycline, Penicillins, Sulfa antibiotics, and Zithromax [azithromycin]    FAMILYHISTORY     History reviewed. No pertinent family history.     SOCIAL HISTORY       Social History     Tobacco Use    Smoking status: Every Day     Current packs/day: 0.50     Types: Cigarettes    Smokeless tobacco: Never   Vaping Use    Vaping status: Never Used   Substance Use Topics    Alcohol use: No    Drug use: No       SCREENINGS        [place] Coma Scale  Eye Opening: Spontaneous  Best Verbal Response: Oriented  Best

## 2025-05-05 ENCOUNTER — HOSPITAL ENCOUNTER (OUTPATIENT)
Age: 65
Setting detail: OBSERVATION
Discharge: HOME | End: 2025-05-06
Attending: STUDENT IN AN ORGANIZED HEALTH CARE EDUCATION/TRAINING PROGRAM | Admitting: STUDENT IN AN ORGANIZED HEALTH CARE EDUCATION/TRAINING PROGRAM
Payer: MEDICARE

## 2025-05-05 DIAGNOSIS — R07.9 CHEST PAIN, UNSPECIFIED TYPE: ICD-10-CM

## 2025-05-05 DIAGNOSIS — M48.02 CERVICAL STENOSIS OF SPINE: Primary | ICD-10-CM

## 2025-05-05 LAB
ANION GAP SERPL CALCULATED.3IONS-SCNC: 12 MMOL/L (ref 7–16)
BASOPHILS # BLD: 0.11 K/UL (ref 0–0.2)
BASOPHILS NFR BLD: 1 % (ref 0–2)
BUN SERPL-MCNC: 11 MG/DL (ref 8–23)
CALCIUM SERPL-MCNC: 9.8 MG/DL (ref 8.8–10.2)
CHLORIDE SERPL-SCNC: 104 MMOL/L (ref 98–107)
CO2 SERPL-SCNC: 24 MMOL/L (ref 22–29)
CREAT SERPL-MCNC: 0.8 MG/DL (ref 0.5–1)
EKG ATRIAL RATE: 78 BPM
EKG ATRIAL RATE: 92 BPM
EKG P AXIS: 54 DEGREES
EKG P AXIS: 67 DEGREES
EKG P-R INTERVAL: 136 MS
EKG P-R INTERVAL: 142 MS
EKG Q-T INTERVAL: 352 MS
EKG Q-T INTERVAL: 384 MS
EKG QRS DURATION: 80 MS
EKG QRS DURATION: 80 MS
EKG QTC CALCULATION (BAZETT): 435 MS
EKG QTC CALCULATION (BAZETT): 437 MS
EKG R AXIS: 51 DEGREES
EKG R AXIS: 67 DEGREES
EKG T AXIS: -5 DEGREES
EKG T AXIS: 6 DEGREES
EKG VENTRICULAR RATE: 78 BPM
EKG VENTRICULAR RATE: 92 BPM
EOSINOPHIL # BLD: 0.05 K/UL (ref 0.05–0.5)
EOSINOPHILS RELATIVE PERCENT: 1 % (ref 0–6)
ERYTHROCYTE [DISTWIDTH] IN BLOOD BY AUTOMATED COUNT: 13.2 % (ref 11.5–15)
GFR, ESTIMATED: 87 ML/MIN/1.73M2
GLUCOSE SERPL-MCNC: 108 MG/DL (ref 74–99)
HCT VFR BLD AUTO: 41.2 % (ref 34–48)
HGB BLD-MCNC: 13.9 G/DL (ref 11.5–15.5)
IMM GRANULOCYTES # BLD AUTO: <0.03 K/UL (ref 0–0.58)
IMM GRANULOCYTES NFR BLD: 0 % (ref 0–5)
LYMPHOCYTES NFR BLD: 1.79 K/UL (ref 1.5–4)
LYMPHOCYTES RELATIVE PERCENT: 23 % (ref 20–42)
MCH RBC QN AUTO: 31.4 PG (ref 26–35)
MCHC RBC AUTO-ENTMCNC: 33.7 G/DL (ref 32–34.5)
MCV RBC AUTO: 93 FL (ref 80–99.9)
MONOCYTES NFR BLD: 0.45 K/UL (ref 0.1–0.95)
MONOCYTES NFR BLD: 6 % (ref 2–12)
NEUTROPHILS NFR BLD: 68 % (ref 43–80)
NEUTS SEG NFR BLD: 5.22 K/UL (ref 1.8–7.3)
PLATELET # BLD AUTO: 321 K/UL (ref 130–450)
PMV BLD AUTO: 9.6 FL (ref 7–12)
POTASSIUM SERPL-SCNC: 4.4 MMOL/L (ref 3.5–5.1)
RBC # BLD AUTO: 4.43 M/UL (ref 3.5–5.5)
SODIUM SERPL-SCNC: 140 MMOL/L (ref 136–145)
TROPONIN I SERPL HS-MCNC: 7 NG/L (ref 0–14)
TROPONIN I SERPL HS-MCNC: 8 NG/L (ref 0–14)
WBC OTHER # BLD: 7.6 K/UL (ref 4.5–11.5)

## 2025-05-05 PROCEDURE — 85025 COMPLETE CBC W/AUTO DIFF WBC: CPT

## 2025-05-05 PROCEDURE — 93005 ELECTROCARDIOGRAM TRACING: CPT

## 2025-05-05 PROCEDURE — 84484 ASSAY OF TROPONIN QUANT: CPT

## 2025-05-05 PROCEDURE — 93010 ELECTROCARDIOGRAM REPORT: CPT | Performed by: INTERNAL MEDICINE

## 2025-05-05 PROCEDURE — 2500000003 HC RX 250 WO HCPCS: Performed by: NURSE PRACTITIONER

## 2025-05-05 PROCEDURE — 99285 EMERGENCY DEPT VISIT HI MDM: CPT

## 2025-05-05 PROCEDURE — G0378 HOSPITAL OBSERVATION PER HR: HCPCS

## 2025-05-05 PROCEDURE — 36415 COLL VENOUS BLD VENIPUNCTURE: CPT

## 2025-05-05 PROCEDURE — 80048 BASIC METABOLIC PNL TOTAL CA: CPT

## 2025-05-05 RX ORDER — REGADENOSON 0.08 MG/ML
0.4 INJECTION, SOLUTION INTRAVENOUS
Status: COMPLETED | OUTPATIENT
Start: 2025-05-05 | End: 2025-05-06

## 2025-05-05 RX ORDER — SODIUM CHLORIDE 9 MG/ML
INJECTION, SOLUTION INTRAVENOUS PRN
Status: DISCONTINUED | OUTPATIENT
Start: 2025-05-05 | End: 2025-05-06 | Stop reason: HOSPADM

## 2025-05-05 RX ORDER — MAGNESIUM SULFATE IN WATER 40 MG/ML
2000 INJECTION, SOLUTION INTRAVENOUS PRN
Status: DISCONTINUED | OUTPATIENT
Start: 2025-05-05 | End: 2025-05-06 | Stop reason: HOSPADM

## 2025-05-05 RX ORDER — ONDANSETRON 4 MG/1
4 TABLET, ORALLY DISINTEGRATING ORAL EVERY 8 HOURS PRN
Status: DISCONTINUED | OUTPATIENT
Start: 2025-05-05 | End: 2025-05-06 | Stop reason: HOSPADM

## 2025-05-05 RX ORDER — POLYETHYLENE GLYCOL 3350 17 G/17G
17 POWDER, FOR SOLUTION ORAL DAILY PRN
Status: DISCONTINUED | OUTPATIENT
Start: 2025-05-05 | End: 2025-05-06 | Stop reason: HOSPADM

## 2025-05-05 RX ORDER — SODIUM CHLORIDE 0.9 % (FLUSH) 0.9 %
5-40 SYRINGE (ML) INJECTION PRN
Status: DISCONTINUED | OUTPATIENT
Start: 2025-05-05 | End: 2025-05-06 | Stop reason: HOSPADM

## 2025-05-05 RX ORDER — ENOXAPARIN SODIUM 100 MG/ML
40 INJECTION SUBCUTANEOUS DAILY
Status: DISCONTINUED | OUTPATIENT
Start: 2025-05-05 | End: 2025-05-06 | Stop reason: HOSPADM

## 2025-05-05 RX ORDER — SODIUM CHLORIDE 0.9 % (FLUSH) 0.9 %
5-40 SYRINGE (ML) INJECTION EVERY 12 HOURS SCHEDULED
Status: DISCONTINUED | OUTPATIENT
Start: 2025-05-05 | End: 2025-05-06 | Stop reason: HOSPADM

## 2025-05-05 RX ORDER — ACETAMINOPHEN 325 MG/1
650 TABLET ORAL EVERY 6 HOURS PRN
Status: DISCONTINUED | OUTPATIENT
Start: 2025-05-05 | End: 2025-05-06 | Stop reason: HOSPADM

## 2025-05-05 RX ORDER — POTASSIUM CHLORIDE 1500 MG/1
40 TABLET, EXTENDED RELEASE ORAL PRN
Status: DISCONTINUED | OUTPATIENT
Start: 2025-05-05 | End: 2025-05-06 | Stop reason: HOSPADM

## 2025-05-05 RX ORDER — ACETAMINOPHEN 650 MG/1
650 SUPPOSITORY RECTAL EVERY 6 HOURS PRN
Status: DISCONTINUED | OUTPATIENT
Start: 2025-05-05 | End: 2025-05-06 | Stop reason: HOSPADM

## 2025-05-05 RX ORDER — POTASSIUM CHLORIDE 7.45 MG/ML
10 INJECTION INTRAVENOUS PRN
Status: DISCONTINUED | OUTPATIENT
Start: 2025-05-05 | End: 2025-05-06 | Stop reason: HOSPADM

## 2025-05-05 RX ORDER — ONDANSETRON 2 MG/ML
4 INJECTION INTRAMUSCULAR; INTRAVENOUS EVERY 6 HOURS PRN
Status: DISCONTINUED | OUTPATIENT
Start: 2025-05-05 | End: 2025-05-06 | Stop reason: HOSPADM

## 2025-05-05 RX ADMIN — SODIUM CHLORIDE, PRESERVATIVE FREE 10 ML: 5 INJECTION INTRAVENOUS at 21:12

## 2025-05-05 ASSESSMENT — PAIN SCALES - GENERAL
PAINLEVEL_OUTOF10: 0
PAINLEVEL_OUTOF10: 4

## 2025-05-05 ASSESSMENT — PAIN DESCRIPTION - LOCATION: LOCATION: CHEST

## 2025-05-05 ASSESSMENT — PAIN DESCRIPTION - ORIENTATION: ORIENTATION: LEFT

## 2025-05-05 ASSESSMENT — PAIN - FUNCTIONAL ASSESSMENT: PAIN_FUNCTIONAL_ASSESSMENT: 0-10

## 2025-05-05 NOTE — PROGRESS NOTES
4 Eyes Skin Assessment     NAME:  Deidre Falk  YOB: 1960  MEDICAL RECORD NUMBER:  [id number]    The patient is being assessed for  Admission    I agree that at least one RN has performed a thorough Head to Toe Skin Assessment on the patient. ALL assessment sites listed below have been assessed.      Areas assessed by both nurses:    Head, Face, Ears, Shoulders, Back, Chest, Arms, Elbows, Hands, Sacrum. Buttock, Coccyx, Ischium, Legs. Feet and Heels, and Under Medical Devices         Does the Patient have a Wound? No noted wound(s)       Axel Prevention initiated by RN: Yes  Wound Care Orders initiated by RN: No    Pressure Injury (Stage 3,4, Unstageable, DTI, NWPT, and Complex wounds) if present, place Wound referral order by RN under : Yes    New Ostomies, if present place, Ostomy referral order under : No     Nurse 1 eSignature: Electronically signed by Fatuma Keller RN on 5/5/25 at 2:57 PM EDT    **SHARE this note so that the co-signing nurse can place an eSignature**    Nurse 2 eSignature: Electronically signed by Cora Baird RN on 5/5/25 at 3:31 PM EDT

## 2025-05-05 NOTE — ED PROVIDER NOTES
[facility] EMERGENCY DEPARTMENT  EMERGENCY DEPARTMENT ENCOUNTER        Pt Name: Deidre Falk  MRN: [id number]  Birthdate 1960  Date of evaluation: 5/5/2025  Provider: Gen Elliott DO  PCP: Maci Dowd APRN - CNP  Note Started: 10:02 AM EDT 5/5/25    CHIEF COMPLAINT       Chief Complaint   Patient presents with    Chest Pain     Pt states she left [facility] yesterday AMA. Pt reported left sided pain in neck , shoulder, breast.        HISTORY OF PRESENT ILLNESS: 1 or more Elements   History From: PATIENT     Limitations to history : None    Deidre Falk is a 64 y.o. female with prior history of current smoking arriving with a complaint of chest pain primarily affecting her left shoulder left arm left neck and left chest.  She reports the pain sort of wraps around from her back to her anterior chest.  She was in the emergency department yesterday where she saw Dr. Garcia in [place] and it was recommended she be transferred for admission and stress testing.  At the time she had a D-dimer < 200 and a troponin level < 6.  Patient left AMA.  The pain has persisted so she returned.      Nursing Notes were all reviewed and agreed with or any disagreements were addressed in the HPI.    REVIEW OF SYSTEMS :      Review of Systems    POSITIVE (+): chest pain  NEGATIVE (-): fevers, chills, nausea, vomiting, diarrhea, constipation, shortness of breath, abdominal pain      SURGICAL HISTORY     Past Surgical History:   Procedure Laterality Date    MECKEL DIVERTICULUM EXCISION         CURRENTMEDICATIONS       Previous Medications    No medications on file       ALLERGIES     Amoxicillin, Doxycycline, Penicillins, Sulfa antibiotics, and Zithromax [azithromycin]    FAMILYHISTORY     No family history on file.     SOCIAL HISTORY       Social History     Tobacco Use    Smoking status: Every Day     Current packs/day: 0.50     Types: Cigarettes    Smokeless tobacco:

## 2025-05-05 NOTE — H&P
136/115   Pulse (!) 101   Temp 97.5 °F (36.4 °C) (Temporal)   Resp 16   SpO2 96%     General Appearance: alert and oriented to person, place and time and in no acute distress  Eyes: pupils equal, round, and reactive   Pulmonary/Chest: Faint bilateral wheezes auscultated.  Unlabored.  On room air.    Cardiovascular: normal rate, normal S1 and S2 and no carotid bruits  Abdomen: soft, non-tender, non-distended, normal bowel sounds  Extremities: no cyanosis, no clubbing and no edema  Neurologic: speech normal        LABS:  Recent Labs     05/04/25  1430 05/05/25  1011    140   K 3.9 4.4    104   CO2 23 24   BUN 13 11   CREATININE 0.8 0.8   GLUCOSE 83 108*   CALCIUM 9.5 9.8       Recent Labs     05/04/25  1430 05/05/25  1011   WBC 7.4 7.6   RBC 4.50 4.43   HGB 13.9 13.9   HCT 41.2 41.2   MCV 91.6 93.0   MCH 30.9 31.4   MCHC 33.7 33.7   RDW 13.3 13.2    321   MPV 9.5 9.6       No results for input(s): \"POCGLU\" in the last 72 hours.        Radiology:   CT CERVICAL SPINE WO CONTRAST    (Results Pending)           ASSESSMENT:      Principal Problem:    Chest pain  Active Problems:    Cervical stenosis of spine  Resolved Problems:    * No resolved hospital problems. *      PLAN:    Chest pain: D-dimer < 200. EKG shows nonspecific ST abnormality.  HST less than 6>7 get 3rd trop top to trend.  Make patient n.p.o. after midnight.  Get cardiac stress test tomorrow.  Hx C3-C6 stenosis: Found after previous ER visits in 2023 with complaints of upper back pain. ? Questionable cause of current pain. Will get CT cervical spine.  Tobacco abuse:    Code Status: Full code  DVT prophylaxis: Lovenox    In review of EMR, evaluation, management, and diagnosis. Discharge plan has been discussed with attending. Time spent 55 mins     NOTE: This report was transcribed using voice recognition software. Every effort was made to ensure accuracy; however, inadvertent computerized transcription errors may be  present.  Electronically signed by RICARDO Zheng CNP on 5/5/2025 at 12:37 PM

## 2025-05-05 NOTE — ED NOTES
ED TO INPATIENT SBAR HANDOFF    Patient Name: Deidre Falk   Preferred Name: Deidre  : 1960  64 y.o.   Code Status Order: Full Code  Telemetry Order: Yes  C-SSRS: Risk of Suicide: No Risk  Sitter no   Restraints:       Situation  Chief Complaint   Patient presents with    Chest Pain     Pt states she left [facility] yesterday AMA. Pt reported left sided pain in neck , shoulder, breast.      Brief Description of Patient's Condition: Trop -.  Stress test ordered for tomorrow.  Mental Status: alert    Background  Allergies:   Allergies   Allergen Reactions    Amoxicillin     Doxycycline     Penicillins     Sulfa Antibiotics     Zithromax [Azithromycin]        Assessment  Vitals/MEWS:    Level of Consciousness: Alert (0)   Vitals:    25 0847 25 0913   BP:  (!) 136/115   Pulse: (!) 101    Resp:  16   Temp: 97.5 °F (36.4 °C)    TempSrc: Temporal    SpO2: 96%      Cardiac Rhythm:    Deterioration Index (DI):    Deterioration Index (DI) Interventions Performed:    O2 Flow Rate:    O2 Device: O2 Device: None (Room air)    Active Central Lines:                          Active Wounds:    Active Davidson's:      Recommendation  Patient Belongings:    Additional Comments: Aox3. Denies CP. Ambulatory  If any further questions, please call Sending RN at [phone]  Opportunity for questions and clarification were provided to (name of person notified and time): [id number] RN.  Raine       Electronically signed by: Electronically signed by Reg Mcclendon RN on 2025 at 1:11 PM

## 2025-05-06 ENCOUNTER — APPOINTMENT (OUTPATIENT)
Dept: CT IMAGING | Age: 65
End: 2025-05-06
Payer: MEDICARE

## 2025-05-06 ENCOUNTER — APPOINTMENT (OUTPATIENT)
Age: 65
End: 2025-05-06
Payer: MEDICARE

## 2025-05-06 VITALS
HEART RATE: 76 BPM | RESPIRATION RATE: 18 BRPM | BODY MASS INDEX: 21.26 KG/M2 | OXYGEN SATURATION: 100 % | TEMPERATURE: 97.6 F | WEIGHT: 120 LBS | HEIGHT: 63 IN | DIASTOLIC BLOOD PRESSURE: 66 MMHG | SYSTOLIC BLOOD PRESSURE: 119 MMHG

## 2025-05-06 LAB
ANION GAP SERPL CALCULATED.3IONS-SCNC: 12 MMOL/L (ref 7–16)
BASOPHILS # BLD: 0.1 K/UL (ref 0–0.2)
BASOPHILS NFR BLD: 1 % (ref 0–2)
BUN SERPL-MCNC: 15 MG/DL (ref 8–23)
CALCIUM SERPL-MCNC: 9 MG/DL (ref 8.8–10.2)
CHLORIDE SERPL-SCNC: 107 MMOL/L (ref 98–107)
CO2 SERPL-SCNC: 23 MMOL/L (ref 22–29)
CREAT SERPL-MCNC: 0.7 MG/DL (ref 0.5–1)
ECHO BSA: 1.56 M2
EOSINOPHIL # BLD: 0.16 K/UL (ref 0.05–0.5)
EOSINOPHILS RELATIVE PERCENT: 2 % (ref 0–6)
ERYTHROCYTE [DISTWIDTH] IN BLOOD BY AUTOMATED COUNT: 13.4 % (ref 11.5–15)
GFR, ESTIMATED: 90 ML/MIN/1.73M2
GLUCOSE SERPL-MCNC: 108 MG/DL (ref 74–99)
HCT VFR BLD AUTO: 36.2 % (ref 34–48)
HGB BLD-MCNC: 12.2 G/DL (ref 11.5–15.5)
IMM GRANULOCYTES # BLD AUTO: 0.03 K/UL (ref 0–0.58)
IMM GRANULOCYTES NFR BLD: 0 % (ref 0–5)
LYMPHOCYTES NFR BLD: 2.48 K/UL (ref 1.5–4)
LYMPHOCYTES RELATIVE PERCENT: 33 % (ref 20–42)
MCH RBC QN AUTO: 31.4 PG (ref 26–35)
MCHC RBC AUTO-ENTMCNC: 33.7 G/DL (ref 32–34.5)
MCV RBC AUTO: 93.1 FL (ref 80–99.9)
MONOCYTES NFR BLD: 0.67 K/UL (ref 0.1–0.95)
MONOCYTES NFR BLD: 9 % (ref 2–12)
NEUTROPHILS NFR BLD: 55 % (ref 43–80)
NEUTS SEG NFR BLD: 4.15 K/UL (ref 1.8–7.3)
NUC STRESS EJECTION FRACTION: 87 %
PLATELET # BLD AUTO: 297 K/UL (ref 130–450)
PMV BLD AUTO: 9.9 FL (ref 7–12)
POTASSIUM SERPL-SCNC: 4.2 MMOL/L (ref 3.5–5.1)
RBC # BLD AUTO: 3.89 M/UL (ref 3.5–5.5)
SODIUM SERPL-SCNC: 141 MMOL/L (ref 136–145)
STRESS BASELINE DIAS BP: 88 MMHG
STRESS BASELINE HR: 76 BPM
STRESS BASELINE SYS BP: 134 MMHG
STRESS ESTIMATED WORKLOAD: 1 METS
STRESS PEAK DIAS BP: 84 MMHG
STRESS PEAK SYS BP: 140 MMHG
STRESS PERCENT HR ACHIEVED: 76 %
STRESS POST PEAK HR: 118 BPM
STRESS RATE PRESSURE PRODUCT: NORMAL BPM*MMHG
STRESS TARGET HR: 156 BPM
TID: 1.07
WBC OTHER # BLD: 7.6 K/UL (ref 4.5–11.5)

## 2025-05-06 PROCEDURE — 36415 COLL VENOUS BLD VENIPUNCTURE: CPT

## 2025-05-06 PROCEDURE — 78452 HT MUSCLE IMAGE SPECT MULT: CPT

## 2025-05-06 PROCEDURE — 99232 SBSQ HOSP IP/OBS MODERATE 35: CPT

## 2025-05-06 PROCEDURE — G0378 HOSPITAL OBSERVATION PER HR: HCPCS

## 2025-05-06 PROCEDURE — 80048 BASIC METABOLIC PNL TOTAL CA: CPT

## 2025-05-06 PROCEDURE — 6360000002 HC RX W HCPCS: Performed by: NURSE PRACTITIONER

## 2025-05-06 PROCEDURE — 3430000000 HC RX DIAGNOSTIC RADIOPHARMACEUTICAL: Performed by: RADIOLOGY

## 2025-05-06 PROCEDURE — 72125 CT NECK SPINE W/O DYE: CPT

## 2025-05-06 PROCEDURE — 96374 THER/PROPH/DIAG INJ IV PUSH: CPT

## 2025-05-06 PROCEDURE — 93016 CV STRESS TEST SUPVJ ONLY: CPT | Performed by: INTERNAL MEDICINE

## 2025-05-06 PROCEDURE — 85025 COMPLETE CBC W/AUTO DIFF WBC: CPT

## 2025-05-06 PROCEDURE — A9500 TC99M SESTAMIBI: HCPCS | Performed by: RADIOLOGY

## 2025-05-06 PROCEDURE — 78452 HT MUSCLE IMAGE SPECT MULT: CPT | Performed by: INTERNAL MEDICINE

## 2025-05-06 PROCEDURE — 2500000003 HC RX 250 WO HCPCS: Performed by: NURSE PRACTITIONER

## 2025-05-06 PROCEDURE — 93017 CV STRESS TEST TRACING ONLY: CPT

## 2025-05-06 PROCEDURE — 93018 CV STRESS TEST I&R ONLY: CPT | Performed by: INTERNAL MEDICINE

## 2025-05-06 PROCEDURE — 6360000002 HC RX W HCPCS

## 2025-05-06 RX ORDER — TETRAKIS(2-METHOXYISOBUTYLISOCYANIDE)COPPER(I) TETRAFLUOROBORATE 1 MG/ML
12 INJECTION, POWDER, LYOPHILIZED, FOR SOLUTION INTRAVENOUS
Status: COMPLETED | OUTPATIENT
Start: 2025-05-06 | End: 2025-05-06

## 2025-05-06 RX ORDER — ALPRAZOLAM 0.25 MG
0.25 TABLET ORAL
Status: DISCONTINUED | OUTPATIENT
Start: 2025-05-06 | End: 2025-05-06 | Stop reason: HOSPADM

## 2025-05-06 RX ORDER — MIDAZOLAM HYDROCHLORIDE 2 MG/2ML
0.5 INJECTION, SOLUTION INTRAMUSCULAR; INTRAVENOUS ONCE
Status: COMPLETED | OUTPATIENT
Start: 2025-05-06 | End: 2025-05-06

## 2025-05-06 RX ORDER — TETRAKIS(2-METHOXYISOBUTYLISOCYANIDE)COPPER(I) TETRAFLUOROBORATE 1 MG/ML
35 INJECTION, POWDER, LYOPHILIZED, FOR SOLUTION INTRAVENOUS
Status: COMPLETED | OUTPATIENT
Start: 2025-05-06 | End: 2025-05-06

## 2025-05-06 RX ADMIN — Medication 35 MILLICURIE: at 10:26

## 2025-05-06 RX ADMIN — SODIUM CHLORIDE, PRESERVATIVE FREE 10 ML: 5 INJECTION INTRAVENOUS at 08:00

## 2025-05-06 RX ADMIN — Medication 12 MILLICURIE: at 08:52

## 2025-05-06 RX ADMIN — REGADENOSON 0.4 MG: 0.08 INJECTION, SOLUTION INTRAVENOUS at 10:21

## 2025-05-06 RX ADMIN — MIDAZOLAM 0.5 MG: 1 INJECTION INTRAMUSCULAR; INTRAVENOUS at 10:06

## 2025-05-06 ASSESSMENT — PAIN SCALES - GENERAL: PAINLEVEL_OUTOF10: 0

## 2025-05-06 NOTE — PLAN OF CARE
Problem: Discharge Planning  Goal: Discharge to home or other facility with appropriate resources  5/5/2025 2104 by Roma Ferrari, RN  Outcome: Progressing     Problem: Pain  Goal: Verbalizes/displays adequate comfort level or baseline comfort level  5/5/2025 2104 by Roma Ferrari, RN  Outcome: Progressing

## 2025-05-06 NOTE — PLAN OF CARE
Problem: Discharge Planning  Goal: Discharge to home or other facility with appropriate resources  5/6/2025 1318 by Fatuma Keller RN  Outcome: Adequate for Discharge     Problem: Pain  Goal: Verbalizes/displays adequate comfort level or baseline comfort level  5/6/2025 1318 by Fatuma Keller RN  Outcome: Adequate for Discharge     Problem: ABCDS Injury Assessment  Goal: Absence of physical injury  5/6/2025 1318 by Fatuma Keller RN  Outcome: Adequate for Discharge     Problem: Safety - Adult  Goal: Free from fall injury  5/6/2025 1318 by Fatuma Keller RN  Outcome: Adequate for Discharge

## 2025-05-06 NOTE — PLAN OF CARE
Problem: Discharge Planning  Goal: Discharge to home or other facility with appropriate resources  5/6/2025 0803 by Fatuma Keller, RN  Outcome: Progressing     Problem: Pain  Goal: Verbalizes/displays adequate comfort level or baseline comfort level  5/6/2025 0803 by Fatuma Keller, RN  Outcome: Progressing     Problem: ABCDS Injury Assessment  Goal: Absence of physical injury  Outcome: Progressing     Problem: Safety - Adult  Goal: Free from fall injury  Outcome: Progressing

## 2025-05-06 NOTE — PROGRESS NOTES
Hospitalist Progress Note      Synopsis:   This is a 64-year-old female with no known past medical history who presented to the ED with complaints of chest pain.   she was seen in the ED yesterday with similar complaints and signed out AMA.  Patient states her pain has been ongoing for the past month.  She has been seen in the ED previously for complaints of back and neck pain.  She reports her pain progressed yesterday after cleaning her house.  She notes left shoulder, neck, and chest wall pain.  Patient states her pain starts in her back and wraps around to her chest and her left breast.  The pain under her left axilla is tender to palpation     Hospital day 0     Subjective:  Stable overnight.  No issues reported.   Patient seen and examined at stress lab.  Patient having anxiety attack about undergoing imaging portion of study.  Patient also stating that she is going to leave AMA if she is not discharged today.  Records reviewed.       Temp (24hrs), Av.8 °F (36.6 °C), Min:97.5 °F (36.4 °C), Max:98.2 °F (36.8 °C)    DIET: Diet NPO  CODE: Full Code  No intake or output data in the 24 hours ending 25 0800    Review of Systems:    All bolded are positive; please see HPI  General:  Fever, chills, diaphoresis, fatigue, malaise, night sweats, weight loss  Psychological:  Anxiety, disorientation, hallucinations.  ENT:  Epistaxis, headaches, vertigo, visual changes.  Cardiovascular:  Chest pain, irregular heartbeats, palpitations, paroxysmal nocturnal dyspnea.  Respiratory:  Shortness of breath, coughing, sputum production, hemoptysis, wheezing, orthopnea.  Gastrointestinal:  Nausea, vomiting, diarrhea, heartburn, constipation, abdominal pain, hematemesis, hematochezia, melena, acholic stools  Genito-Urinary:  Dysuria, urgency, frequency, hematuria  Musculoskeletal:  Joint pain, joint stiffness, joint swelling, muscle pain  Neurology:  Headache, focal neurological deficits, weakness, numbness,

## 2025-05-06 NOTE — PATIENT CARE CONFERENCE
P Quality Flow/Interdisciplinary Rounds Progress Note        Quality Flow Rounds held on May 6, 2025    Disciplines Attending:  Bedside Nurse, , , and Nursing Unit Leadership    Deidre Falk was admitted on 5/5/2025  1:07 PM    Anticipated Discharge Date:       Disposition:    Axel Score:  Axel Scale Score: 23    [facility] RISK OF UNPLANNED READMISSION 2.0             0 Total Score        Discussed patient goal for the day, patient clinical progression, and barriers to discharge.  The following Goal(s) of the Day/Commitment(s) have been identified:  Report labs/diagnostics      Akanksha Kyle RN  May 6, 2025

## 2025-05-06 NOTE — DISCHARGE SUMMARY
Hospitalist Discharge Summary    Patient ID: Deidre Falk   Patient : 1960  Patient's PCP: Maci Dowd APRN - CNP    Admit Date: 2025   Admitting Physician: Maria Milanes Marino, MD    Discharge Date:  2025   Discharge Physician: RICARDO Olmedo CNP   Discharge Condition: Stable  Discharge Disposition: Home      Hospital course in brief:  (Please refer to daily progress notes for a comprehensive review of the hospitalization by requesting medical records)  This is a 64-year-old female with no known past medical history who presented to the ED with complaints of chest pain. she was seen in the ED yesterday with similar complaints and signed out AMA. Patient states her pain has been ongoing for the past month. She has been seen in the ED previously for complaints of back and neck pain. She reports her pain progressed yesterday after cleaning her house. She notes left shoulder, neck, and chest wall pain. Patient states her pain starts in her back and wraps around to her chest and her left breast. The pain under her left axilla is tender to palpation.  Stress test was completed and unremarkable.  CT of cervical spine does show some degenerative changes hide outpatient referral for neurosurgery was made.  Patient stable for discharge from medical perspective.      Consults:   IP CONSULT TO INTERNAL MEDICINE    Discharge Diagnoses:    Chest pain  D-dimer < 200. EKG shows nonspecific ST abnormality.    Troponin less than 6>7>8  Chest pain was reproducible on palpation   stress test unremarkable     Hx C3-C6 stenosis  Found after previous ER visits in  with complaints of upper back pain. ? Questionable cause of current pain.   CT of cervical spine with no acute fracture or traumatic malalignment, multilevel degenerative changes  Outpatient referral for neurosurgery was made     Tobacco abuse  Educate on cessation    Discharge Instructions / Follow up:  Follow-up with  neurosurgery  Follow-up with PCP    No future appointments.    The patient's condition is stable.  At this time the patient is without objective evidence of an acute process requiring continuing hospitalization or inpatient management.  They are stable for discharge with outpatient follow-up.     I have spoken with the patient and discussed the results of the current hospitalization, in addition to providing specific details for the plan of care and counseling regarding the diagnosis and prognosis.  The plan has been discussed in detail and they are aware of the specific conditions for emergent return, as well as the importance of follow-up.  Their questions are answered at this time and they are agreeable with the plan for discharge to home.    Continued appropriate risk factor modification of blood pressure, diabetes and serum lipids will remain essential to reducing risk of future atherosclerotic development    Activity: activity as tolerated    Physical exam:  General appearance: No apparent distress, appears stated age and cooperative.  HEENT: Normal cephalic, atraumatic without obvious deformity. Pupils equal, round, and reactive to light.  Extra ocular muscles intact. Conjunctivae/corneas clear.  Neck: Supple, with full range of motion. No jugular venous distention. Trachea midline.  Respiratory:  Clear to auscultation bilaterally.  No apparent distress.  Cardiovascular:  Regular rate and rhythm. S1, S2 without murmurs, rubs, or gallops.   PV: Brisk capillary refill.  +2 pedal and radial pulses bilaterally. No clubbing, cyanosis, edema of bilateral lower extremities.   Abdomen: Soft, non-tender, non-distended. +BS  Musculoskeletal: No obvious deformities or erythematous or edematous joints.   Skin: Normal skin color.  No rashes or lesions.  Neurologic:  Neurovascularly intact without any focal sensory/motor deficits. Cranial nerves: II-XII intact, grossly non-focal.  Psychiatric: Alert and oriented, thought